# Patient Record
Sex: FEMALE | Race: WHITE | Employment: OTHER | URBAN - METROPOLITAN AREA
[De-identification: names, ages, dates, MRNs, and addresses within clinical notes are randomized per-mention and may not be internally consistent; named-entity substitution may affect disease eponyms.]

---

## 2022-01-17 ENCOUNTER — HOSPITAL ENCOUNTER (INPATIENT)
Age: 87
LOS: 3 days | Discharge: HOME HEALTH | DRG: 871 | End: 2022-01-21
Attending: EMERGENCY MEDICINE | Admitting: INTERNAL MEDICINE
Payer: MEDICARE

## 2022-01-17 DIAGNOSIS — K81.1 CHRONIC CHOLECYSTITIS: ICD-10-CM

## 2022-01-17 DIAGNOSIS — K86.89 PANCREATIC MASS: ICD-10-CM

## 2022-01-17 DIAGNOSIS — K85.90 ACUTE PANCREATITIS, UNSPECIFIED COMPLICATION STATUS, UNSPECIFIED PANCREATITIS TYPE: Primary | ICD-10-CM

## 2022-01-17 DIAGNOSIS — K83.09 ASCENDING CHOLANGITIS: ICD-10-CM

## 2022-01-17 PROCEDURE — 51702 INSERT TEMP BLADDER CATH: CPT

## 2022-01-17 PROCEDURE — 96374 THER/PROPH/DIAG INJ IV PUSH: CPT

## 2022-01-17 PROCEDURE — 93005 ELECTROCARDIOGRAM TRACING: CPT | Performed by: STUDENT IN AN ORGANIZED HEALTH CARE EDUCATION/TRAINING PROGRAM

## 2022-01-17 PROCEDURE — 99285 EMERGENCY DEPT VISIT HI MDM: CPT

## 2022-01-18 ENCOUNTER — APPOINTMENT (OUTPATIENT)
Dept: ULTRASOUND IMAGING | Age: 87
DRG: 871 | End: 2022-01-18
Payer: MEDICARE

## 2022-01-18 ENCOUNTER — APPOINTMENT (OUTPATIENT)
Dept: NUCLEAR MEDICINE | Age: 87
DRG: 871 | End: 2022-01-18
Payer: MEDICARE

## 2022-01-18 ENCOUNTER — APPOINTMENT (OUTPATIENT)
Dept: MRI IMAGING | Age: 87
DRG: 871 | End: 2022-01-18
Payer: MEDICARE

## 2022-01-18 PROBLEM — A41.9 SEPSIS (HCC): Status: ACTIVE | Noted: 2022-01-18

## 2022-01-18 PROBLEM — K83.09 ASCENDING CHOLANGITIS: Status: ACTIVE | Noted: 2022-01-18

## 2022-01-18 PROBLEM — K81.9 CHOLECYSTITIS: Status: ACTIVE | Noted: 2022-01-18

## 2022-01-18 PROBLEM — I10 ESSENTIAL HYPERTENSION: Status: ACTIVE | Noted: 2022-01-18

## 2022-01-18 PROBLEM — K85.10 ACUTE BILIARY PANCREATITIS: Status: ACTIVE | Noted: 2022-01-18

## 2022-01-18 LAB
-: NORMAL
ALBUMIN SERPL-MCNC: 3.3 G/DL (ref 3.5–5.2)
ALBUMIN/GLOBULIN RATIO: 1.2 (ref 1–2.5)
ALP BLD-CCNC: 185 U/L (ref 35–104)
ALT SERPL-CCNC: 946 U/L (ref 5–33)
AMYLASE: 646 U/L (ref 28–100)
ANION GAP SERPL CALCULATED.3IONS-SCNC: 10 MMOL/L (ref 9–17)
ANION GAP SERPL CALCULATED.3IONS-SCNC: 12 MMOL/L (ref 9–17)
AST SERPL-CCNC: 1139 U/L
BILIRUB SERPL-MCNC: 2.92 MG/DL (ref 0.3–1.2)
BUN BLDV-MCNC: 17 MG/DL (ref 8–23)
BUN BLDV-MCNC: 20 MG/DL (ref 8–23)
BUN/CREAT BLD: ABNORMAL (ref 9–20)
BUN/CREAT BLD: ABNORMAL (ref 9–20)
CALCIUM IONIZED: 1.12 MMOL/L (ref 1.13–1.33)
CALCIUM SERPL-MCNC: 8.3 MG/DL (ref 8.6–10.4)
CALCIUM SERPL-MCNC: 8.3 MG/DL (ref 8.6–10.4)
CHLORIDE BLD-SCNC: 105 MMOL/L (ref 98–107)
CHLORIDE BLD-SCNC: 109 MMOL/L (ref 98–107)
CO2: 22 MMOL/L (ref 20–31)
CO2: 23 MMOL/L (ref 20–31)
CREAT SERPL-MCNC: 0.75 MG/DL (ref 0.5–0.9)
CREAT SERPL-MCNC: 0.79 MG/DL (ref 0.5–0.9)
EKG ATRIAL RATE: 76 BPM
EKG P AXIS: 88 DEGREES
EKG P-R INTERVAL: 140 MS
EKG Q-T INTERVAL: 388 MS
EKG QRS DURATION: 80 MS
EKG QTC CALCULATION (BAZETT): 436 MS
EKG R AXIS: -4 DEGREES
EKG T AXIS: 34 DEGREES
EKG VENTRICULAR RATE: 76 BPM
GFR AFRICAN AMERICAN: >60 ML/MIN
GFR AFRICAN AMERICAN: >60 ML/MIN
GFR NON-AFRICAN AMERICAN: >60 ML/MIN
GFR NON-AFRICAN AMERICAN: >60 ML/MIN
GFR SERPL CREATININE-BSD FRML MDRD: ABNORMAL ML/MIN/{1.73_M2}
GLUCOSE BLD-MCNC: 121 MG/DL (ref 70–99)
GLUCOSE BLD-MCNC: 151 MG/DL (ref 70–99)
HCT VFR BLD CALC: 38 % (ref 36.3–47.1)
HEMOGLOBIN: 12.5 G/DL (ref 11.9–15.1)
INR BLD: 1.1
LACTIC ACID, SEPSIS WHOLE BLOOD: 2.3 MMOL/L (ref 0.5–1.9)
LACTIC ACID, SEPSIS: ABNORMAL MMOL/L (ref 0.5–1.9)
LIPASE: 1473 U/L (ref 13–60)
MAGNESIUM: 1.4 MG/DL (ref 1.6–2.6)
MCH RBC QN AUTO: 29.8 PG (ref 25.2–33.5)
MCHC RBC AUTO-ENTMCNC: 32.9 G/DL (ref 28.4–34.8)
MCV RBC AUTO: 90.7 FL (ref 82.6–102.9)
NRBC AUTOMATED: 0 PER 100 WBC
PDW BLD-RTO: 15.7 % (ref 11.8–14.4)
PHOSPHORUS: 2.8 MG/DL (ref 2.6–4.5)
PLATELET # BLD: 171 K/UL (ref 138–453)
PMV BLD AUTO: 11.1 FL (ref 8.1–13.5)
POTASSIUM SERPL-SCNC: 3.5 MMOL/L (ref 3.7–5.3)
POTASSIUM SERPL-SCNC: 4.6 MMOL/L (ref 3.7–5.3)
PROTHROMBIN TIME: 11.3 SEC (ref 9.1–12.3)
RBC # BLD: 4.19 M/UL (ref 3.95–5.11)
REASON FOR REJECTION: NORMAL
SARS-COV-2, RAPID: NOT DETECTED
SODIUM BLD-SCNC: 139 MMOL/L (ref 135–144)
SODIUM BLD-SCNC: 142 MMOL/L (ref 135–144)
SPECIMEN DESCRIPTION: NORMAL
TOTAL PROTEIN: 6 G/DL (ref 6.4–8.3)
TRIGL SERPL-MCNC: 55 MG/DL
WBC # BLD: 18.5 K/UL (ref 3.5–11.3)
ZZ NTE CLEAN UP: ORDERED TEST: NORMAL
ZZ NTE WITH NAME CLEAN UP: SPECIMEN SOURCE: NORMAL

## 2022-01-18 PROCEDURE — 2580000003 HC RX 258: Performed by: NURSE PRACTITIONER

## 2022-01-18 PROCEDURE — 2580000003 HC RX 258: Performed by: CLINICAL NURSE SPECIALIST

## 2022-01-18 PROCEDURE — 99222 1ST HOSP IP/OBS MODERATE 55: CPT | Performed by: INTERNAL MEDICINE

## 2022-01-18 PROCEDURE — 83690 ASSAY OF LIPASE: CPT

## 2022-01-18 PROCEDURE — 85027 COMPLETE CBC AUTOMATED: CPT

## 2022-01-18 PROCEDURE — 6360000002 HC RX W HCPCS: Performed by: STUDENT IN AN ORGANIZED HEALTH CARE EDUCATION/TRAINING PROGRAM

## 2022-01-18 PROCEDURE — 3430000000 HC RX DIAGNOSTIC RADIOPHARMACEUTICAL: Performed by: NURSE PRACTITIONER

## 2022-01-18 PROCEDURE — 87635 SARS-COV-2 COVID-19 AMP PRB: CPT

## 2022-01-18 PROCEDURE — A9537 TC99M MEBROFENIN: HCPCS | Performed by: NURSE PRACTITIONER

## 2022-01-18 PROCEDURE — 80048 BASIC METABOLIC PNL TOTAL CA: CPT

## 2022-01-18 PROCEDURE — 82150 ASSAY OF AMYLASE: CPT

## 2022-01-18 PROCEDURE — 83605 ASSAY OF LACTIC ACID: CPT

## 2022-01-18 PROCEDURE — 6360000002 HC RX W HCPCS: Performed by: INTERNAL MEDICINE

## 2022-01-18 PROCEDURE — 87040 BLOOD CULTURE FOR BACTERIA: CPT

## 2022-01-18 PROCEDURE — 36415 COLL VENOUS BLD VENIPUNCTURE: CPT

## 2022-01-18 PROCEDURE — 2500000003 HC RX 250 WO HCPCS: Performed by: STUDENT IN AN ORGANIZED HEALTH CARE EDUCATION/TRAINING PROGRAM

## 2022-01-18 PROCEDURE — 82330 ASSAY OF CALCIUM: CPT

## 2022-01-18 PROCEDURE — 83735 ASSAY OF MAGNESIUM: CPT

## 2022-01-18 PROCEDURE — 84100 ASSAY OF PHOSPHORUS: CPT

## 2022-01-18 PROCEDURE — 6370000000 HC RX 637 (ALT 250 FOR IP): Performed by: CLINICAL NURSE SPECIALIST

## 2022-01-18 PROCEDURE — 2580000003 HC RX 258: Performed by: INTERNAL MEDICINE

## 2022-01-18 PROCEDURE — 78226 HEPATOBILIARY SYSTEM IMAGING: CPT

## 2022-01-18 PROCEDURE — 99223 1ST HOSP IP/OBS HIGH 75: CPT | Performed by: INTERNAL MEDICINE

## 2022-01-18 PROCEDURE — 99222 1ST HOSP IP/OBS MODERATE 55: CPT | Performed by: NURSE PRACTITIONER

## 2022-01-18 PROCEDURE — 80053 COMPREHEN METABOLIC PANEL: CPT

## 2022-01-18 PROCEDURE — 6370000000 HC RX 637 (ALT 250 FOR IP): Performed by: INTERNAL MEDICINE

## 2022-01-18 PROCEDURE — 1200000000 HC SEMI PRIVATE

## 2022-01-18 PROCEDURE — 84478 ASSAY OF TRIGLYCERIDES: CPT

## 2022-01-18 PROCEDURE — 76705 ECHO EXAM OF ABDOMEN: CPT

## 2022-01-18 PROCEDURE — 85610 PROTHROMBIN TIME: CPT

## 2022-01-18 PROCEDURE — 6360000002 HC RX W HCPCS: Performed by: NURSE PRACTITIONER

## 2022-01-18 RX ORDER — ONDANSETRON 4 MG/1
4 TABLET, ORALLY DISINTEGRATING ORAL EVERY 8 HOURS PRN
Status: DISCONTINUED | OUTPATIENT
Start: 2022-01-18 | End: 2022-01-21 | Stop reason: HOSPADM

## 2022-01-18 RX ORDER — SODIUM CHLORIDE 0.9 % (FLUSH) 0.9 %
5-40 SYRINGE (ML) INJECTION EVERY 12 HOURS SCHEDULED
Status: DISCONTINUED | OUTPATIENT
Start: 2022-01-18 | End: 2022-01-21 | Stop reason: HOSPADM

## 2022-01-18 RX ORDER — MAGNESIUM SULFATE 1 G/100ML
1000 INJECTION INTRAVENOUS PRN
Status: DISCONTINUED | OUTPATIENT
Start: 2022-01-18 | End: 2022-01-21 | Stop reason: HOSPADM

## 2022-01-18 RX ORDER — POTASSIUM CHLORIDE 7.45 MG/ML
10 INJECTION INTRAVENOUS PRN
Status: DISCONTINUED | OUTPATIENT
Start: 2022-01-18 | End: 2022-01-21 | Stop reason: HOSPADM

## 2022-01-18 RX ORDER — SODIUM CHLORIDE 9 MG/ML
25 INJECTION, SOLUTION INTRAVENOUS PRN
Status: DISCONTINUED | OUTPATIENT
Start: 2022-01-18 | End: 2022-01-21 | Stop reason: HOSPADM

## 2022-01-18 RX ORDER — ONDANSETRON 2 MG/ML
4 INJECTION INTRAMUSCULAR; INTRAVENOUS ONCE
Status: COMPLETED | OUTPATIENT
Start: 2022-01-18 | End: 2022-01-18

## 2022-01-18 RX ORDER — MIDODRINE HYDROCHLORIDE 2.5 MG/1
2.5 TABLET ORAL
Status: DISCONTINUED | OUTPATIENT
Start: 2022-01-18 | End: 2022-01-21 | Stop reason: HOSPADM

## 2022-01-18 RX ORDER — ATORVASTATIN CALCIUM 80 MG/1
40 TABLET, FILM COATED ORAL NIGHTLY
Status: DISCONTINUED | OUTPATIENT
Start: 2022-01-18 | End: 2022-01-21 | Stop reason: HOSPADM

## 2022-01-18 RX ORDER — HYDROCODONE BITARTRATE AND ACETAMINOPHEN 5; 325 MG/1; MG/1
2 TABLET ORAL EVERY 4 HOURS PRN
Status: DISCONTINUED | OUTPATIENT
Start: 2022-01-18 | End: 2022-01-21 | Stop reason: HOSPADM

## 2022-01-18 RX ORDER — 0.9 % SODIUM CHLORIDE 0.9 %
250 INTRAVENOUS SOLUTION INTRAVENOUS ONCE
Status: COMPLETED | OUTPATIENT
Start: 2022-01-18 | End: 2022-01-18

## 2022-01-18 RX ORDER — SODIUM CHLORIDE 9 MG/ML
INJECTION, SOLUTION INTRAVENOUS CONTINUOUS
Status: DISCONTINUED | OUTPATIENT
Start: 2022-01-18 | End: 2022-01-18

## 2022-01-18 RX ORDER — CIPROFLOXACIN 2 MG/ML
400 INJECTION, SOLUTION INTRAVENOUS EVERY 12 HOURS
Status: DISCONTINUED | OUTPATIENT
Start: 2022-01-18 | End: 2022-01-18

## 2022-01-18 RX ORDER — POTASSIUM CHLORIDE 20 MEQ/1
40 TABLET, EXTENDED RELEASE ORAL ONCE
Status: COMPLETED | OUTPATIENT
Start: 2022-01-18 | End: 2022-01-18

## 2022-01-18 RX ORDER — ONDANSETRON 2 MG/ML
4 INJECTION INTRAMUSCULAR; INTRAVENOUS EVERY 6 HOURS PRN
Status: DISCONTINUED | OUTPATIENT
Start: 2022-01-18 | End: 2022-01-21 | Stop reason: HOSPADM

## 2022-01-18 RX ORDER — LOSARTAN POTASSIUM 50 MG/1
100 TABLET ORAL DAILY
Status: DISCONTINUED | OUTPATIENT
Start: 2022-01-18 | End: 2022-01-21 | Stop reason: HOSPADM

## 2022-01-18 RX ORDER — SODIUM CHLORIDE 0.9 % (FLUSH) 0.9 %
5-40 SYRINGE (ML) INJECTION PRN
Status: DISCONTINUED | OUTPATIENT
Start: 2022-01-18 | End: 2022-01-21 | Stop reason: HOSPADM

## 2022-01-18 RX ORDER — HYDROCODONE BITARTRATE AND ACETAMINOPHEN 5; 325 MG/1; MG/1
1 TABLET ORAL EVERY 4 HOURS PRN
Status: DISCONTINUED | OUTPATIENT
Start: 2022-01-18 | End: 2022-01-21 | Stop reason: HOSPADM

## 2022-01-18 RX ORDER — ACETAMINOPHEN 325 MG/1
650 TABLET ORAL EVERY 4 HOURS PRN
Status: DISCONTINUED | OUTPATIENT
Start: 2022-01-18 | End: 2022-01-21 | Stop reason: HOSPADM

## 2022-01-18 RX ORDER — SODIUM CHLORIDE, SODIUM LACTATE, POTASSIUM CHLORIDE, AND CALCIUM CHLORIDE .6; .31; .03; .02 G/100ML; G/100ML; G/100ML; G/100ML
500 INJECTION, SOLUTION INTRAVENOUS ONCE
Status: COMPLETED | OUTPATIENT
Start: 2022-01-18 | End: 2022-01-18

## 2022-01-18 RX ORDER — PANTOPRAZOLE SODIUM 40 MG/1
40 TABLET, DELAYED RELEASE ORAL
Status: DISCONTINUED | OUTPATIENT
Start: 2022-01-18 | End: 2022-01-21 | Stop reason: HOSPADM

## 2022-01-18 RX ORDER — SODIUM CHLORIDE, SODIUM LACTATE, POTASSIUM CHLORIDE, CALCIUM CHLORIDE 600; 310; 30; 20 MG/100ML; MG/100ML; MG/100ML; MG/100ML
INJECTION, SOLUTION INTRAVENOUS CONTINUOUS
Status: ACTIVE | OUTPATIENT
Start: 2022-01-18 | End: 2022-01-19

## 2022-01-18 RX ORDER — CIPROFLOXACIN 2 MG/ML
400 INJECTION, SOLUTION INTRAVENOUS ONCE
Status: COMPLETED | OUTPATIENT
Start: 2022-01-18 | End: 2022-01-18

## 2022-01-18 RX ORDER — MORPHINE SULFATE 4 MG/ML
2 INJECTION, SOLUTION INTRAMUSCULAR; INTRAVENOUS ONCE
Status: DISCONTINUED | OUTPATIENT
Start: 2022-01-18 | End: 2022-01-21 | Stop reason: HOSPADM

## 2022-01-18 RX ADMIN — METRONIDAZOLE 500 MG: 500 INJECTION, SOLUTION INTRAVENOUS at 02:36

## 2022-01-18 RX ADMIN — SODIUM CHLORIDE 250 ML: 0.9 INJECTION, SOLUTION INTRAVENOUS at 05:00

## 2022-01-18 RX ADMIN — MIDODRINE HYDROCHLORIDE 2.5 MG: 2.5 TABLET ORAL at 12:06

## 2022-01-18 RX ADMIN — CIPROFLOXACIN 400 MG: 2 INJECTION, SOLUTION INTRAVENOUS at 00:23

## 2022-01-18 RX ADMIN — MIDODRINE HYDROCHLORIDE 2.5 MG: 2.5 TABLET ORAL at 17:28

## 2022-01-18 RX ADMIN — PIPERACILLIN AND TAZOBACTAM 3375 MG: 3; .375 INJECTION, POWDER, FOR SOLUTION INTRAVENOUS at 10:53

## 2022-01-18 RX ADMIN — SODIUM CHLORIDE: 9 INJECTION, SOLUTION INTRAVENOUS at 02:45

## 2022-01-18 RX ADMIN — ATORVASTATIN CALCIUM 40 MG: 80 TABLET, FILM COATED ORAL at 20:31

## 2022-01-18 RX ADMIN — ONDANSETRON 4 MG: 2 INJECTION INTRAMUSCULAR; INTRAVENOUS at 00:12

## 2022-01-18 RX ADMIN — SINCALIDE 1.32 MCG: 5 INJECTION, POWDER, LYOPHILIZED, FOR SOLUTION INTRAVENOUS at 14:00

## 2022-01-18 RX ADMIN — Medication 3 MILLICURIE: at 14:35

## 2022-01-18 RX ADMIN — SODIUM CHLORIDE, POTASSIUM CHLORIDE, SODIUM LACTATE AND CALCIUM CHLORIDE 500 ML: 600; 310; 30; 20 INJECTION, SOLUTION INTRAVENOUS at 07:56

## 2022-01-18 RX ADMIN — SODIUM CHLORIDE, POTASSIUM CHLORIDE, SODIUM LACTATE AND CALCIUM CHLORIDE: 600; 310; 30; 20 INJECTION, SOLUTION INTRAVENOUS at 10:44

## 2022-01-18 RX ADMIN — MIDODRINE HYDROCHLORIDE 2.5 MG: 2.5 TABLET ORAL at 09:00

## 2022-01-18 RX ADMIN — POTASSIUM CHLORIDE 40 MEQ: 1500 TABLET, EXTENDED RELEASE ORAL at 10:54

## 2022-01-18 ASSESSMENT — ENCOUNTER SYMPTOMS
DIARRHEA: 0
TROUBLE SWALLOWING: 0
VOMITING: 1
NAUSEA: 1
ABDOMINAL PAIN: 1
SINUS PRESSURE: 0
RHINORRHEA: 0
ABDOMINAL PAIN: 0
COLOR CHANGE: 0
BLOOD IN STOOL: 0
VOMITING: 0
CONSTIPATION: 0
SINUS PAIN: 0
COUGH: 0
SHORTNESS OF BREATH: 0
CHEST TIGHTNESS: 0
BACK PAIN: 0
DIARRHEA: 1
EYE REDNESS: 0
SORE THROAT: 0

## 2022-01-18 NOTE — CONSULTS
History and Physical - General Surgery    PATIENT NAME: Heydi Bhakta  AGE: 80 y.o. MEDICAL RECORD NO. [id number]  DATE: 1/18/2022  SURGEON: Dr. Joe Burkett: No primary care provider on file. Patient evaluated at the request of  Dr. Rosalva Singh  Reason for evaluation: Concern for gallstone pancreatitis     Patient information was obtained from past medical records. History/Exam limitations: dementia. Patient presented to the Emergency Department by ambulance where the patient received see Ambulance Run Sheet prior to arrival.    IMPRESSION:     Patient Active Problem List   Diagnosis    Cholecystitis    Acute biliary pancreatitis    Essential hypertension   80year old female, likely gallstone pancreatitis       MEDICAL DECISION MAKING AND PLAN:   Recommend admission to medicine  Trend lipase, LFT's  RUQ Ultrasound   Recommend GI consult for possible MRCP vs ERCP for further evaluation of dilated CBD and elevated LFTs  Recommend IV abx: cipro, flagyl  Will continue to follow and determine need for cholecystectomy after improvement of pancreatitis and choledocholithiasis work up complete      HISTORY:   History of Chief Complaint:    Heydi Bhakta is a 80 y.o. female who presents as a transfer from [facility] for further evaluation of possible gallstone pancreatitis. Patient has a history of dementia, and history was obtained from hospital records. Patient reportedly with some diffuse abdominal pain and an episode of emesis today, which led her to seek treatment. Patient underwent CT imaging that demonstrated gallbladder distention and wall thickening as well as a prominent CBD and stranding around the pancrease. WBC 11.5, T bili 2.2, lipase 11,893, and AST//326. She was transferred to [facility] for GI and general surgery evaluation. Past Medical History   has a past medical history of Essential hypertension, Hyperlipidemia, and Pre-diabetes.   Past Surgical History   has a past surgical history that includes Appendectomy (N/A); katia and bso (cervix removed) (N/A); Colonoscopy (N/A); and Pacemaker insertion (N/A, 12/30/2015). Medications  Prior to Admission medications    Not on File    Scheduled Meds:   morphine  2 mg IntraVENous Once    ciprofloxacin  400 mg IntraVENous Q12H    metroNIDAZOLE  500 mg IntraVENous Q8H    sodium chloride flush  5-40 mL IntraVENous 2 times per day    enoxaparin  40 mg SubCUTAneous Daily    atorvastatin  40 mg Oral Nightly    [Held by provider] losartan  100 mg Oral Daily    pantoprazole  40 mg Oral QAM AC     Continuous Infusions:   PRN Meds:. Allergies  is allergic to adhesive tape. Family History  family history includes No Known Problems in her father and mother. Social History   reports that she has never smoked. She has never used smokeless tobacco.   reports no history of alcohol use. reports no history of drug use. Review of Systems  Unable to obtain accurate ROS as patient has a history of dementia and answers \"yes\" to all questions     PHYSICAL:   VITALS:  height is 5' 4\" (1.626 m) and weight is 145 lb (65.8 kg). Her oral temperature is 97.4 °F (36.3 °C). Her blood pressure is 170/59 (abnormal) and her pulse is 79. Her respiration is 20 and oxygen saturation is 98%. CONSTITUTIONAL: awake, alert, no apparent distress   HEENT: NCAT   NECK: supple, trachea midline   LUNGS: no acute respiratory distress, or accessory muscle use   CARDIOVASCULAR:regular rate   ABDOMEN: soft, non-distended, some tenderness to RUQ with palpation.  No rebound, guarding, or rigidity   NEUROLOGIC: CN II-XII are grossly intact, baseline dementia   EXTREMITIES: no cyanosis, clubbing or edema    LABS:   CBC with Differential:    Lab Results   Component Value Date    WBC 18.5 01/18/2022    RBC 4.19 01/18/2022    HGB 12.5 01/18/2022    HCT 38.0 01/18/2022     01/18/2022    MCV 90.7 01/18/2022    MCH 29.8 01/18/2022    MCHC 32.9 01/18/2022    RDW 15.7 01/18/2022 BMP:    Lab Results   Component Value Date     01/18/2022    K 3.5 01/18/2022     01/18/2022    CO2 22 01/18/2022    BUN 17 01/18/2022    LABALBU 3.3 01/18/2022    CREATININE 0.79 01/18/2022    CALCIUM 8.3 01/18/2022    GFRAA >60 01/18/2022    LABGLOM >60 01/18/2022    GLUCOSE 151 01/18/2022     Hepatic Function Panel:    Lab Results   Component Value Date    ALKPHOS 185 01/18/2022    ALT PENDING 01/18/2022    AST PENDING 01/18/2022    PROT 6.0 01/18/2022    BILITOT 2.92 01/18/2022    LABALBU 3.3 01/18/2022       RADIOLOGY:   CT imaging performed at [facility]. Images sent to be uploaded to PACS. Thank you for the interesting evaluation. Further recommendations to follow. Arsen Damon DO  1/18/22, 12:05 AM        Attending Note    Patient seen as a general surgery consultation for gallstone pancreatitis. GI medicine to evaluate for need for ERCP  I have reviewed the above TECSS note(s) and I either performed the key elements of the medical history and physical exam or was present with the resident when the key elements of the medical history and physical exam were performed. I have discussed the findings, established the care plan and recommendations with Resident Lico.     Casandra Cr MD  1/18/2022  8:23 AM

## 2022-01-18 NOTE — CONSULTS
[facility] -   Infectious diseases evaluation  admission date 1/17/2022    reason for consultation:   Ascending cholangitis    Impression :   Current:  · Ascending cholangitis   · Acute Cholecystitis w biliary obstruction  · Pancreatitis - likely gallstone   · Leukocytosis     Other:  ·   Discussion / summary of stay / plan of care   ·   Recommendations   · cipro and flagyl stopped  · On zosyn   ·  HIDA - acute kathi and biliary obst picture  · GI consulted  · cannot get MRI due to pacemaker not being compatible     Infection Control Recommendations   · [place] Precautions      Antimicrobial Stewardship Recommendations   · Simplification of therapy  · Targeted therapy    Coordination ofOutpatient Care:   · Estimated Length of IV antimicrobials:  · Patient will need Midline / picc Catheter Insertion:   · Patient will need SNF:  · Patient will need outpatient wound care:     History of Present Illness:   Initial history:  Leon Baer is a 80y.o.-year-old female who presented to the ED from [facility] for concerns of acute cholecystitis vs pancreatitis. She was having abd pain and nausea/vomiting with  and , elevated total bilirubin of 2.2, indirect bilirubin 1.2, lipase 73206, and WBC 11.5. CT abd pelvis w/ contrast showed dilation and wall thickening of the gallbladder and a dilated common bile duct with fat stranding around the pancreas. She was transferred here to see GI for possible ERCP and GS evaluation. On my exam ,after the HIDA. Pleasant confused and no abd pain or tenderness - no nausea or vomiting at this time  Not in distress  No cellultiis      Interval changes  1/18/2022   BP (!) 105/49   Pulse 65   Temp 97.4 °F (36.3 °C) (Oral)   Resp 18   Ht 5' 4\" (1.626 m)   Wt 145 lb (65.8 kg)   SpO2 95%   BMI 24.89 kg/m²    Remains afebrile. Hypotension frequently.      Summary of relevant labs:  Labs:  WBC 18.5  Cr 0.79    AST 1139  Bilirubin 2.92  Lipase 1473    Micro:  Blood cx ordered     Imaging:  HIDA  Acute cholecystitis with biliary obstruction    US gallbladder RUQ 1/18: cholelithiasis with significant gallbladder wall edema seen in the setting of pancreatitis, hepatitis or acute cholecystitis. Trace amount of fluid and Man's pouch. MRI abd ordered     I have personally reviewed the past medical history, past surgical history, medications, social history, and family history, and I haveupdated the database accordingly.   Past Medical History:     Past Medical History:   Diagnosis Date    Essential hypertension     Hyperlipidemia     Pre-diabetes        Past Surgical  History:     Past Surgical History:   Procedure Laterality Date    APPENDECTOMY N/A     COLONOSCOPY N/A     PACEMAKER INSERTION N/A 12/30/2015    KIRSTEN AND BSO N/A        Medications:      morphine  2 mg IntraVENous Once    sodium chloride flush  5-40 mL IntraVENous 2 times per day    enoxaparin  40 mg SubCUTAneous Daily    atorvastatin  40 mg Oral Nightly    [Held by provider] losartan  100 mg Oral Daily    pantoprazole  40 mg Oral QAM AC    lactated ringers bolus  500 mL IntraVENous Once    midodrine  2.5 mg Oral TID WC    piperacillin-tazobactam  3,375 mg IntraVENous Q8H       Social History:     Social History     Socioeconomic History    Marital status: Unknown     Spouse name: Not on file    Number of children: Not on file    Years of education: Not on file    Highest education level: Not on file   Occupational History    Not on file   Tobacco Use    Smoking status: Never Smoker    Smokeless tobacco: Never Used   Substance and Sexual Activity    Alcohol use: Never    Drug use: Never    Sexual activity: Not on file   Other Topics Concern    Not on file   Social History Narrative    Not on file     Social Determinants of Health     Financial Resource Strain:     Difficulty of Paying Living Expenses: Not on file   Food Insecurity:  Worried About Running Out of Food in the Last Year: Not on file    Fred of Food in the Last Year: Not on file   Transportation Needs:     Lack of Transportation (Medical): Not on file    Lack of Transportation (Non-Medical): Not on file   Physical Activity:     Days of Exercise per Week: Not on file    Minutes of Exercise per Session: Not on file   Stress:     Feeling of Stress : Not on file   Social Connections:     Frequency of Communication with Friends and Family: Not on file    Frequency of Social Gatherings with Friends and Family: Not on file    Attends Muslim Services: Not on file    Active Member of Clubs or Organizations: Not on file    Attends Club or Organization Meetings: Not on file    Marital Status: Not on file   Intimate Partner Violence:     Fear of Current or Ex-Partner: Not on file    Emotionally Abused: Not on file    Physically Abused: Not on file    Sexually Abused: Not on file   Housing Stability:     Unable to Pay for Housing in the Last Year: Not on file    Number of [place] in the Last Year: Not on file    Unstable Housing in the Last Year: Not on file       Family History:     Family History   Problem Relation Age of Onset    No Known Problems Mother     No Known Problems Father         Allergies:   Adhesive tape     Review of Systems:     Review of Systems   Constitutional: Negative for chills and fever. HENT: Negative for congestion and rhinorrhea. Eyes: Negative for redness and visual disturbance. Respiratory: Negative for cough and shortness of breath. Cardiovascular: Negative for chest pain and leg swelling. Gastrointestinal: Positive for nausea and vomiting. Negative for abdominal pain. Endocrine: Negative for polyphagia. Genitourinary: Negative for dysuria and frequency. Musculoskeletal: Negative for arthralgias and myalgias. Skin: Negative for color change and rash.    Allergic/Immunologic: Negative for immunocompromised state.   Neurological: Negative for dizziness, light-headedness and headaches. Hematological: Negative for adenopathy. Psychiatric/Behavioral: Positive for confusion. Negative for agitation and sleep disturbance. The patient is not nervous/anxious. Physical Examination :     Patient Vitals for the past 8 hrs:   BP Pulse Resp SpO2   01/18/22 0757 (!) 105/49 65 18 95 %   01/18/22 0705 (!) 93/40 66 20 97 %   01/18/22 0650 (!) 97/47 65 20    01/18/22 0649    98 %   01/18/22 0605  65 18 98 %   01/18/22 0547 (!) 96/46 70 18    01/18/22 0546    98 %   01/18/22 0542  67 20 97 %   01/18/22 0516 (!) 87/43 67 20 97 %   01/18/22 0445 (!) 93/43 68 22 98 %   01/18/22 0355  69 19 99 %   01/18/22 0331 (!) 99/53 71 20 96 %   01/18/22 0301 (!) 105/49 67 24 97 %   01/18/22 0251    98 %   01/18/22 0247 (!) 112/53 68 22    01/18/22 0117 (!) 125/58 73 26 97 %   01/18/22 0102 (!) 128/56 69 23 99 %   01/18/22 0003 (!) 156/53 68 29 98 %       Physical Exam  Constitutional:       Appearance: She is normal weight. She is ill-appearing. HENT:      Head: Normocephalic and atraumatic. Nose: Nose normal.      Mouth/Throat:      Mouth: Mucous membranes are moist.      Pharynx: Oropharynx is clear. Eyes:      General: No scleral icterus. Extraocular Movements: Extraocular movements intact. Conjunctiva/sclera: Conjunctivae normal.   Cardiovascular:      Rate and Rhythm: Normal rate and regular rhythm. Pulses: Normal pulses. Heart sounds: Normal heart sounds. Pulmonary:      Effort: Pulmonary effort is normal. No respiratory distress. Breath sounds: Normal breath sounds. Abdominal:      General: Abdomen is flat. Palpations: Abdomen is soft. Tenderness: There is no abdominal tenderness. There is no guarding. Genitourinary:     Comments: No proctor  No CVA tenderness  Musculoskeletal:         General: Normal range of motion.       Cervical back: Normal range of motion and neck supple. Right lower leg: No edema. Left lower leg: No edema. Skin:     General: Skin is warm and dry. Capillary Refill: Capillary refill takes less than 2 seconds. Neurological:      Mental Status: She is alert. Mental status is at baseline. She is disoriented. Cranial Nerves: No cranial nerve deficit. Sensory: No sensory deficit. Psychiatric:         Behavior: Behavior normal.         Thought Content: Thought content normal.           Medical Decision Making:   I have independently reviewed/ordered the following labs:    CBC with Differential:   Recent Labs     01/18/22 0444   WBC 18.5*   HGB 12.5   HCT 38.0        BMP:  Recent Labs     01/18/22 0444      K 3.5*      CO2 22   BUN 17   CREATININE 0.79   MG 1.4*     Hepatic Function Panel:   Recent Labs     01/18/22 0444   PROT 6.0*   LABALBU 3.3*   BILITOT 2.92*   ALKPHOS 185*   *   AST 1,139*     No results for input(s): RPR in the last 72 hours. No results for input(s): HIV in the last 72 hours. No results for input(s): BC in the last 72 hours. Lab Results   Component Value Date    CREATININE 0.79 01/18/2022    GLUCOSE 151 01/18/2022     Thank you for allowing us to participate in the care of this patient. Please call with questions. This note is created with the assistance of a speech recognition program.  While intending to generate adocument that actually reflects the content of the visit, the document can still have some errors including those of syntax and sound a like substitutions which may escape proof reading. It such instances, actual meaningcan be extrapolated by contextual diversion. Kamla Hare, OMS-IV      I have discussed the care of the patient, including pertinent history and exam findings,  with the resident. I have seen and examined the patient and the key elements of all parts of the encounter have been performed by me.   I agree with the assessment, plan and orders as documented by the resident.     Lana Garibay, Infectious Diseases

## 2022-01-18 NOTE — H&P
[facility]  Office: [address], DO, Daya Valadez, DO, Celestino Pack, DO, Jay Santoyo, DO, Lindsey Lindsay MD, Faustina Harrington MD, Ciara Farrell MD, Will Bangura MD, Chapin Johnson MD, Kandice Ellis MD, Latia Arevalo MD, Amna Portillo, DO, Gosia Rod, DO, Henrry Monsivais MD,  Ann Burroughs DO, Noah Lay MD, Jose Stapleton MD, Herminio Islas MD, Radha Lu MD, Marisela Chambers MD, Lopez Baptiste MD, Breanna Hubbard MD, Don Franklin, [facility], [facility], CNP, Marco Alvarenga, CNP, Yolie Fischer, CNS, Tamela Griggs, CNP, Tyra Mae, CNP, Valerie Bell, CNP, Don Hernandez, CNP, Peyton Shabazz, CNP, Jigar Elizabeth PA-C, Roc Milner, DNP, Kandace Vides, LIZBETH, Klaudia Luna, CNP, Priya Tripp, CNP, Terrell Bernstein, CNP, Mor Obregon, CNP, Mila West, CNP, Main Gonzalez, [address]    HISTORY AND PHYSICAL EXAMINATION            Date:   1/18/2022  Patient name:  Moreno Vera  Date of admission:  1/17/2022 11:22 PM  MRN:   [id number]  Account:  [de-identified]  YOB: 1931  PCP:    Rebekah Man DO  Room:   03/03  Code Status:    Full Code    Chief Complaint:     Chief Complaint   Patient presents with    Abdominal Pain       History Obtained From:     patient, electronic medical record    History of Present Illness:     Moreno Vera is a 80 y.o. female who presents with Abdominal Pain  She was transferred to [facility] ED from [address] [place] [address] Dr. ARIZA Jan 17 for the management of Cholecystitis. Pt is a poor historian. ED notes indicate abd pain, N/V since this afternoon. Lipase 11,893, , , , T-bili 2.2, I-Bili 1.2 Lactic 1.7, WBC 11.5,  (<101), glucose 153, Covid neg    CT Abd/pelvis w IV contrast:  Distended gall bladder-5 cm (new since Aug '21), hyperdense material within the fundus-possible stones or sludge.   Fat stranding in pancreas    Pt received 1L IVF, Zosyn, Fentanyl & Zofran prior to transfer. Past Medical History:     Past Medical History:   Diagnosis Date    Essential hypertension     Hyperlipidemia         Past Surgical History:     Past Surgical History:   Procedure Laterality Date    APPENDECTOMY N/A     COLONOSCOPY N/A     PACEMAKER INSERTION N/A 2015    KIRSTEN AND BSO N/A         Medications Prior to Admission:     Prior to Admission medications    Not on File        Allergies:     Adhesive tape    Social History:     Tobacco:    reports that she has never smoked. She has never used smokeless tobacco.  Alcohol:      reports no history of alcohol use. Drug Use:  reports no history of drug use. Family History:     Family History   Problem Relation Age of Onset    No Known Problems Mother     No Known Problems Father        Review of Systems:     Positive and Negative as described in HPI. Review of Systems   Constitutional: Positive for appetite change. HENT: Negative for sore throat and trouble swallowing. Respiratory: Negative for shortness of breath. Cardiovascular: Negative for chest pain, palpitations and leg swelling. Gastrointestinal: Positive for abdominal pain, diarrhea, nausea and vomiting. Negative for blood in stool. Genitourinary: Negative for dysuria and hematuria. Neurological: Negative for headaches. All other systems reviewed and are negative. Physical Exam:   BP (!) 105/49   Pulse 67   Temp 97.4 °F (36.3 °C) (Oral)   Resp 24   Ht 5' 4\" (1.626 m)   Wt 145 lb (65.8 kg)   SpO2 97%   BMI 24.89 kg/m²   Temp (24hrs), Av.4 °F (36.3 °C), Min:97.4 °F (36.3 °C), Max:97.4 °F (36.3 °C)    No results for input(s): POCGLU in the last 72 hours. No intake or output data in the 24 hours ending 22 7132    Physical Exam  Vitals and nursing note reviewed. HENT:      Head: Normocephalic. Mouth/Throat:      Mouth: Mucous membranes are dry.       Pharynx: No oropharyngeal exudate. Comments: Poor dentition  Eyes:      General: No scleral icterus. Pupils: Pupils are equal, round, and reactive to light. Cardiovascular:      Rate and Rhythm: Normal rate and regular rhythm. Heart sounds: Murmur (soft sys best heard left sternal border) heard. Pulmonary:      Effort: Pulmonary effort is normal. No respiratory distress. Abdominal:      General: Bowel sounds are normal. There is distension. Palpations: Abdomen is soft. Tenderness: There is abdominal tenderness (LUQ). Comments: Minimal pain during exam   Musculoskeletal:         General: No swelling or tenderness. Lymphadenopathy:      Cervical: No cervical adenopathy. Skin:     General: Skin is warm and dry. Capillary Refill: Capillary refill takes less than 2 seconds. Coloration: Skin is pale. Neurological:      Mental Status: She is alert. Cranial Nerves: No cranial nerve deficit. Psychiatric:         Behavior: Behavior normal.         Investigations:      Laboratory Testing:  Recent Results (from the past 24 hour(s))   COVID-19, Rapid    Collection Time: 01/18/22  1:18 AM    Specimen: Nasopharyngeal Swab   Result Value Ref Range    Specimen Description . NASOPHARYNGEAL SWAB     SARS-CoV-2, Rapid Not Detected Not Detected       Imaging/Diagnostics:  No results found. Assessment :      Hospital Problems           Last Modified POA    * (Principal) Cholecystitis 1/18/2022 Yes    Acute biliary pancreatitis 1/18/2022 Yes    Essential hypertension 1/18/2022 Yes          Plan:     Patient status inpatient in the Med/Surge    1. NPO  2. Home meds reviewed and reordered  3. Lovenox for DVT prophylaxis  4. Consult Gen Surgery  5. Gall bladder US in am  6. Cipro/Flagyl  7. Norco/Dilaudid for pain  8.  Trend Labs    Consultations:   IP CONSULT TO GENERAL SURGERY  IP CONSULT TO HOSPITALIST    Patient is admitted as inpatient status because of co-morbidities listed above, severity of signs and symptoms as outlined, requirement for current medical therapies and most importantly because of direct risk to patient if care not provided in a hospital setting. Expected length of stay > 48 hours.     JACKELINE Garcia - CNS  1/18/2022  3:39 AM    Copy sent to Dr. Helga Berrios, DO

## 2022-01-18 NOTE — ED PROVIDER NOTES
[facility] ED     Emergency Department     Faculty Attestation    I performed a history and physical examination of the patient and discussed management with the resident. I reviewed the residents note and agree with the documented findings and plan of care. Any areas of disagreement are noted on the chart. I was personally present for the key portions of any procedures. I have documented in the chart those procedures where I was not present during the key portions. I have reviewed the emergency nurses triage note. I agree with the chief complaint, past medical history, past surgical history, allergies, medications, social and family history as documented unless otherwise noted below. For Physician Assistant/ Nurse Practitioner cases/documentation I have personally evaluated this patient and have completed at least one if not all key elements of the E/M (history, physical exam, and MDM). Additional findings are as noted. This patient was evaluated in the Emergency Department for symptoms described in the history of present illness. He/she was evaluated in the context of the global COVID-19 pandemic, which necessitated consideration that the patient might be at risk for infection with the SARS-CoV-2 virus that causes COVID-19. Institutional protocols and algorithms that pertain to the evaluation of patients at risk for COVID-19 are in a state of rapid change based on information released by regulatory bodies including the CDC and federal and state organizations. These policies and algorithms were followed during the patient's care in the ED. Patient transferred for pancreatitis concern for gallstone pancreatitis on CT. On arrival patient appears ill but not toxic. She is afebrile. Appears uncomfortable but resting in the bed. Abdomen soft focal epigastric mid abdominal tenderness but no rebound or guarding.   We will review transfer work-up, call general surgery, call GI, anticipate admission      EKG interpretation: Sinus rhythm 76 normal intervals normal axis no acute ST or T changes no acute findings    Critical Care     none    Marilyn Arguello MD, Umair Bang  Attending Emergency  Physician             Marilyn Arguello MD  01/17/22 1315

## 2022-01-18 NOTE — PROGRESS NOTES
Contacted patient's cardiologist's office to obtain records on patient's pacemaker. Office staff stated they will fax over interrogation report.

## 2022-01-18 NOTE — PROGRESS NOTES
Patient is poor historian. Unable to complete MRI checklist with patient.   Will attempt to contact family to see if they are able to help fill out MRI checklist.

## 2022-01-18 NOTE — ED NOTES
Pt repositioned. Denies any needs at this time. Resting on stretcher with eyes closed. RR even and unlabored. No distress noted. Call light within reach.      Esthela Bah RN  01/18/22 9421

## 2022-01-18 NOTE — ED PROVIDER NOTES
Dr Miguel Means saw pt initially     Marianna Becker DO  01/17/22 1024    Faculty Sign-Out Attestation  Handoff taken on the following patient from prior Attending Physician: Miguel Means    I was available and discussed any additional care issues that arose and coordinated the management plans with the resident(s) caring for the patient during my duty period. Any areas of disagreement with residents documentation of care or procedures are noted on the chart. I was personally present for the key portions of any/all procedures during my duty period. I have documented in the chart those procedures where I was not present during the key portions.     Transfer from out side facility, pancreatitis, needing egd,   admitted    Marianna Becker DO  Attending Physician     Marianna Becker DO  01/18/22 3472

## 2022-01-18 NOTE — ED NOTES
80 yof with abdominal pain white count of 12,000. CT with fat stranding around the gallbladder as well as pancreas and concern for gallstone pancreatitis. No GI or ability for ERCP there. Patient coming for general surgery and GI consultation.     Vital signs stable coming by omar Mobley RN  01/17/22 2901

## 2022-01-18 NOTE — ED NOTES
Pt's blood pressure trending down, now 93/43 while resting. Pt non-symptomatic. Mervat Jones NP notified face to face. 250ml fluid bolus started per verbal order.      Corazon Pace RN  01/18/22 5641

## 2022-01-18 NOTE — ED NOTES
RN updated pts son with pts permission. Jerri Sultana (son): [phone].       Rocio Mena RN  01/18/22 7695

## 2022-01-18 NOTE — ED PROVIDER NOTES
[facility] ED  Emergency Department Encounter  Emergency Medicine Resident     Pt Name: Amanda Peck  MRN: [id number]  Marcusgfashley 12/1/1931  Date of evaluation: 1/17/22  PCP:  Tavo Jones, [address] [place] [address]       Chief Complaint   Patient presents with    Abdominal Pain       HISTORY OFPRESENT ILLNESS  (Location/Symptom, Timing/Onset, Context/Setting, Quality, Duration, Modifying Dipika Broody.)      Amanda Peck is a 80 y. o.yo female who presents as a transfer from outside facility due to concerns for acute cholecystitis versus acute pancreatitis. Patient presented to the outside facility with abdominal pain, found to have elevated CBC. She had significantly elevated LFTs, AST//326. Patient also had an elevated T bili of 2.2, indirect bili is 1.2. Patient had a lipase of 11,893. WBC of 11.5. Underwent CT abdomen pelvis with IV contrast and found to have dilation and wall thickening of the gallbladder with dilated common bile duct and fat stranding around the pancreas. Outside facility did not have GI or ability for ERCP. Patient was transferred here for general surgery evaluation. On my evaluation patient is resting in bed, does admit to some abdominal pain, states her pain has been present since January, unsure how long its been there. Patient denies any vomiting, she does appear uncomfortable on exam.    PAST MEDICAL / SURGICAL / SOCIAL / FAMILY HISTORY      has no past medical history on file. has no past surgical history on file.      Social History     Socioeconomic History    Marital status: Unknown     Spouse name: Not on file    Number of children: Not on file    Years of education: Not on file    Highest education level: Not on file   Occupational History    Not on file   Tobacco Use    Smoking status: Not on file    Smokeless tobacco: Not on file   Substance and Sexual Activity    Alcohol use: Not on file    Drug use: Not on file    Sexual activity: Not on file   Other Topics Concern    Not on file   Social History Narrative    Not on file     Social Determinants of Health     Financial Resource Strain:     Difficulty of Paying Living Expenses: Not on file   Food Insecurity:     Worried About Running Out of Food in the Last Year: Not on file    Fred of Food in the Last Year: Not on file   Transportation Needs:     Lack of Transportation (Medical): Not on file    Lack of Transportation (Non-Medical): Not on file   Physical Activity:     Days of Exercise per Week: Not on file    Minutes of Exercise per Session: Not on file   Stress:     Feeling of Stress : Not on file   Social Connections:     Frequency of Communication with Friends and Family: Not on file    Frequency of Social Gatherings with Friends and Family: Not on file    Attends Nondenominational Services: Not on file    Active Member of [address] [place] [address] [employer] or Organizations: Not on file    Attends Club or Organization Meetings: Not on file    Marital Status: Not on file   Intimate Partner Violence:     Fear of Current or Ex-Partner: Not on file    Emotionally Abused: Not on file    Physically Abused: Not on file    Sexually Abused: Not on file   Housing Stability:     Unable to Pay for Housing in the Last Year: Not on file    Number of [place] in the Last Year: Not on file    Unstable Housing in the Last Year: Not on file       History reviewed. No pertinent family history. Allergies:  Adhesive tape    Home Medications:  Prior to Admission medications    Not on File       REVIEW OFSYSTEMS    (2-9 systems for level 4, 10 or more for level 5)      Review of Systems   Constitutional: Negative for chills, diaphoresis, fatigue and fever. HENT: Negative for congestion, sinus pressure, sinus pain, sore throat and trouble swallowing. Respiratory: Negative for cough, chest tightness and shortness of breath. Cardiovascular: Negative for chest pain, palpitations and leg swelling. Gastrointestinal: Positive for abdominal pain and nausea. Negative for constipation, diarrhea and vomiting. Genitourinary: Negative for difficulty urinating, dysuria, flank pain and urgency. Musculoskeletal: Negative for back pain, neck pain and neck stiffness. Skin: Negative for color change, pallor and rash. Neurological: Negative for dizziness, tremors, speech difficulty, weakness, light-headedness and headaches. PHYSICAL EXAM   (up to 7 for level 4, 8 or more forlevel 5)      INITIAL VITALS:   ED Triage Vitals   BP Temp Temp src Pulse Resp SpO2 Height Weight   170/59 97.4  79 20 98 -- --       Physical Exam  Constitutional:       General: She is not in acute distress. Appearance: She is ill-appearing. HENT:      Head: Normocephalic and atraumatic. Right Ear: External ear normal.      Left Ear: External ear normal.      Nose: Nose normal. No rhinorrhea. Eyes:      Extraocular Movements: Extraocular movements intact. Pupils: Pupils are equal, round, and reactive to light. Cardiovascular:      Rate and Rhythm: Normal rate and regular rhythm. Pulses: Normal pulses. Heart sounds: No murmur heard. Pulmonary:      Effort: Pulmonary effort is normal.      Breath sounds: Normal breath sounds. Abdominal:      General: Abdomen is protuberant. Bowel sounds are normal. There is distension. Palpations: Abdomen is soft. There is no fluid wave. Tenderness: There is abdominal tenderness in the right upper quadrant and epigastric area. Musculoskeletal:         General: No swelling. Normal range of motion. Cervical back: Normal range of motion and neck supple. Skin:     General: Skin is warm and dry. Neurological:      General: No focal deficit present. Mental Status: She is alert and oriented to person, place, and time.          DIFFERENTIAL  DIAGNOSIS     PLAN (LABS / IMAGING / EKG):  Orders Placed This Encounter   Procedures    Inpatient consult to General Surgery    Inpatient consult to Hospitalist    EKG 12 Lead       MEDICATIONS ORDERED:  Orders Placed This Encounter   Medications    morphine injection 2 mg    ondansetron (ZOFRAN) injection 4 mg    ciprofloxacin (CIPRO) IVPB 400 mg     Order Specific Question:   Antimicrobial Indications     Answer:   Intra-Abdominal Infection    metronidazole (FLAGYL) 500 mg in NaCl 100 mL IVPB premix     Order Specific Question:   Antimicrobial Indications     Answer:   Intra-Abdominal Infection       DDX: Acute cholecystitis, acute pancreatitis, choledocholithiasis, common bile duct obstruction    Initial MDM/Plan: 80 y.o. female who presents with abdominal pain and findings concerning for acute cholecystitis and acute pancreatitis. Patient was a transfer from outside facility. Elevated LFTs elevated lipase and CT findings suggestive of pancreatitis and cholecystitis. We will plan for pain control, EKG and GEN surge consult. DIAGNOSTIC RESULTS / EMERGENCYDEPARTMENT COURSE / MDM     LABS:  Labs Reviewed - No data to display      RADIOLOGY:  No results found. CT abdomen pelvis from outside facility performed at 8:44 PM showing dilation and wall thickening involving the gallbladder. Acute cholecystitis is not excluded, prominent caliber of the common bile duct and intrahepatic biliary tree which is new since August concerning for obstruction. Fat stranding around the pancreas may be related to acute pancreatitis    EKG  Sinus rhythm 76, normal intervals, normal axis, no acute ST or T wave changes    All EKG's are interpreted by the Emergency Department Physicianwho either signs or Co-signs this chart in the absence of a cardiologist.    EMERGENCY DEPARTMENT COURSE:  ED Course as of 01/18/22 0036   Mon Jan 17, 2022   2341 Pt seen and evaluated  [CD]   8648 Lab reviewed from outside facility AST//363.  Alk Phos 148, T bili 2.2 and lipase 11,893 [CD]   Tue Jan 18, 2022   0018 Surgery at bedside, recommending antibiotics and admission to medicine. [CD]   0035 Spoke to [facility] for admission. [CD]      ED Course User Index  [CD] Emre Dahl DO          PROCEDURES:  None    CONSULTS:  IP CONSULT TO GENERAL SURGERY  IP CONSULT TO HOSPITALIST    CRITICAL CARE:  Please see attending documentation    FINAL IMPRESSION      1. Acute pancreatitis, unspecified complication status, unspecified pancreatitis type          DISPOSITION / PLAN     DISPOSITION    Admit to [facility]     PATIENT REFERRED TO:  No follow-up provider specified.     DISCHARGE MEDICATIONS:  New Prescriptions    No medications on file       Emre Dahl DO  Emergency Medicine Resident    (Please note that portions of this note were completed with a voice recognition program.Efforts were made to edit the dictations but occasionally words are mis-transcribed.)        Emre Dahl DO  Resident  01/18/22 1212

## 2022-01-18 NOTE — CARE COORDINATION
Case Management Initial Discharge Plan  Sathish Queen,             Called delilah Pérez  to discuss discharge plans, said patient is usually independently with ambulation   Information verified: address, contacts, phone number, , insurance Yes  Insurance Provider: medicare    Emergency Contact/Next of Kin name & number: delilah Pérez  Who are involved in patient's support system? Family     PCP: Bryanna De Oliveira DO  Date of last visit:       Discharge Planning    Living Arrangements:    lives with  Braden Gustafson and son Greg Celaya has 2 stories  ramp to get into door, Location of bedroom/bathroom in home 1st    Patient able to perform ADL's:Assisted    Current Services (outpatient & in home)assisted  DME equipment: none  DME provider: none    Is patient receiving oral anticoagulation therapy? No home medications listed     Potential Assistance Needed:   home care    Patient agreeable to home care: Yes  Freedom of choice provided:  yes, [facility] (already coming in for )    Prior SNF/Rehab Placement and Facility: [facility] Services Evaluation: no    Patient expects to be discharged to:   home    If home: is the family and/or caregiver wiling & able to provide support at home? yes  Who will be providing this support? Family       Transportation provider: family   Transportation arrangements needed for discharge: No    Readmission Risk              Risk of Unplanned Readmission:  11             Does patient have a readmission risk score greater than 14?: No  If yes, follow-up appointment must be made within 7 days of discharge.      Goals of Care: safe transition plan       Educated yes on transitional options, provided freedom of choice and are agreeable with plan      Discharge Plan: return home with family referral to [facility]          Electronically signed by Annel Mendoza RN on 22 at 4:46 PM EST    17:06 call from ayde with [facility] can accept

## 2022-01-18 NOTE — ED NOTES
Report given to Philip Dunne RN. Opportunities for questions offered.       Fany Hernandez RN  01/18/22 3220

## 2022-01-18 NOTE — ED NOTES
RN placed blankets under patients legs to help with hell discomfort. Noted that urine that is draining from proctor appears dark.       Birdie Sow RN  01/18/22 6576

## 2022-01-18 NOTE — ED NOTES
Bed: 03  Expected date:   Expected time:   Means of arrival:   Comments:  Timothy Carlos RN  01/17/22 4317

## 2022-01-18 NOTE — ED NOTES
Writer RN introduced self to patient. Pt denies any needs at this time. Pt remains in trendelenburg position. No acute distress noted.       Latrice Chávez RN  01/18/22 8096

## 2022-01-18 NOTE — CONSULTS
Home medications:  Prior to Admission medications    Not on File     . Current Medications:  Scheduled Meds:   morphine  2 mg IntraVENous Once    sodium chloride flush  5-40 mL IntraVENous 2 times per day    enoxaparin  40 mg SubCUTAneous Daily    atorvastatin  40 mg Oral Nightly    [Held by provider] losartan  100 mg Oral Daily    pantoprazole  40 mg Oral QAM AC    lactated ringers bolus  500 mL IntraVENous Once    midodrine  2.5 mg Oral TID WC    piperacillin-tazobactam  3,375 mg IntraVENous Q8H     . Continuous Infusions:   sodium chloride      lactated ringers       . PRN Meds:sodium chloride flush, sodium chloride, potassium chloride, magnesium sulfate, acetaminophen, HYDROcodone 5 mg - acetaminophen **OR** HYDROcodone 5 mg - acetaminophen, HYDROmorphone **OR** HYDROmorphone, ondansetron **OR** ondansetron    REVIEW OF SYSTEMS:     Constitutional: No fever, no chills, no lethargy, no weakness, no weight loss  HEENT:  No headache, otalgia, itchy eyes, nasal discharge or sore throat. Cardiac:  No chest pain, dyspnea, orthopnea or PND. Chest:   No cough, phlegm or wheezing. Abdomen:   nausea, vomiting,   Neuro:  No focal weakness, abnormal movements or seizure like activity. Skin:   No rashes, no itching. :   No hematuria, no pyuria, no dysuria, no flank pain. Extremities:  No swelling or joint pains. ROS was otherwise negative except as mentioned in the [address] PHYSICAL EXAM:    BP (!) 91/41   Pulse 65   Temp 98 °F (36.7 °C) (Oral)   Resp 18   Ht 5' 4\" (1.626 m)   Wt 145 lb (65.8 kg)   SpO2 98%   BMI 24.89 kg/m²   . TMAX[24]    General: Well developed, Well nourished, No apparent distress  Head:  Normocephalic, Atraumatic  EENT: EOMI, Sclera not icteric, Oropharynx moist  Neck:  Supple, Trachea midline  Lungs:CTA Bilaterally  Heart: RRR, No murmur, No rub, No gallop, PMI nondisplaced. Abdomen:Soft, Non tender, Not distended, BS WNL,  No masses.  No hepatomegalia   Ext:No clubbing. No cyanosis. No edema. Skin: No rashes. No jaundice. No stigmata of liver disease. Neuro:  A&O x Two, ?? Dementia    Imaging:       Hemotological labs: Anemia studies:  No results for input(s): LABIRON, TIBC, FERRITIN, [id number], FOLATE, OCCULTBLD in the last 72 hours. CBC:  Recent Labs     01/18/22 0444   WBC 18.5*   HGB 12.5   MCV 90.7   RDW 15.7*          PT/INR:  Recent Labs     01/18/22  0444   PROTIME 11.3   INR 1.1       BMP:  Recent Labs     01/18/22 0444      K 3.5*      CO2 22   BUN 17   CREATININE 0.79   GLUCOSE 151*   CALCIUM 8.3*       Liver work up:  Hepatitis Functional Panel:  Recent Labs     01/18/22 0444   ALKPHOS 185*   *   AST 1,139*   PROT 6.0*   BILITOT 2.92*   LABALBU 3.3*       Amylase/Lipase/Ammonia:  Recent Labs     01/18/22 0444   AMYLASE 646*   LIPASE 1,473*       Acute Hepatitis Panel:  No results found for: HEPBSAG, HEPCAB, HEPBIGM, HEPAIGM         Principal Problem:    Cholecystitis  Active Problems:    Acute biliary pancreatitis    Essential hypertension  Resolved Problems:    * No resolved hospital problems. *       GI Impression and recommendations and plan:    17-year-old female with suspected acute cholecystitis, gallbladder pancreatitis and possible, biliary obstruction. -CT scan showed distended gallbladder 5 cm hyperdense material within the fundus possible stone versus sludge and fat stranding in pancreas. -Gallbladder ultrasound shows cholelithiasis with significant gallbladder wall edema  -No hx of alcohol use, triglycerides WNL,      1. 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 Pt will need repeat BMP at noon to monitor potassium level  3. Rec proctor cathter to monitor strict intake and out take given pt needs aggressive IVF, IV antibiotics and renal status needs to be monitored closely  4. Rec daily CBC, BMP, LFT.    5. Agree with antibiotics   6. Complete POC to follow HIDA results    This plan was formulated in collaboration with Dr. Jamaal Edwards MD      Electronically signed by:  Shiela Canchola  Gastroenterology  [phone]  1/18/2022    9:09 AM     This note was created with the assistance of a speech-recognition program.  Although the intention is to generate a document that actually reflects the content of the visit, no guarantees can be provided that every mistake has been identified and corrected by editing.

## 2022-01-19 ENCOUNTER — ANESTHESIA EVENT (OUTPATIENT)
Dept: OPERATING ROOM | Age: 87
DRG: 871 | End: 2022-01-19
Payer: MEDICARE

## 2022-01-19 ENCOUNTER — ANESTHESIA (OUTPATIENT)
Dept: OPERATING ROOM | Age: 87
DRG: 871 | End: 2022-01-19
Payer: MEDICARE

## 2022-01-19 VITALS — OXYGEN SATURATION: 98 % | TEMPERATURE: 96 F | DIASTOLIC BLOOD PRESSURE: 72 MMHG | SYSTOLIC BLOOD PRESSURE: 151 MMHG

## 2022-01-19 LAB
ABSOLUTE EOS #: 0.08 K/UL (ref 0–0.44)
ABSOLUTE IMMATURE GRANULOCYTE: 0.04 K/UL (ref 0–0.3)
ABSOLUTE LYMPH #: 1.87 K/UL (ref 1.1–3.7)
ABSOLUTE MONO #: 0.64 K/UL (ref 0.1–1.2)
ALBUMIN SERPL-MCNC: 2.6 G/DL (ref 3.5–5.2)
ALBUMIN/GLOBULIN RATIO: 1 (ref 1–2.5)
ALP BLD-CCNC: 189 U/L (ref 35–104)
ALT SERPL-CCNC: 572 U/L (ref 5–33)
ANION GAP SERPL CALCULATED.3IONS-SCNC: 8 MMOL/L (ref 9–17)
AST SERPL-CCNC: 313 U/L
BASOPHILS # BLD: 0 % (ref 0–2)
BASOPHILS ABSOLUTE: 0.04 K/UL (ref 0–0.2)
BILIRUB SERPL-MCNC: 2.19 MG/DL (ref 0.3–1.2)
BUN BLDV-MCNC: 20 MG/DL (ref 8–23)
BUN/CREAT BLD: ABNORMAL (ref 9–20)
CALCIUM SERPL-MCNC: 8.1 MG/DL (ref 8.6–10.4)
CHLORIDE BLD-SCNC: 107 MMOL/L (ref 98–107)
CO2: 22 MMOL/L (ref 20–31)
CREAT SERPL-MCNC: 0.73 MG/DL (ref 0.5–0.9)
DIFFERENTIAL TYPE: ABNORMAL
EOSINOPHILS RELATIVE PERCENT: 1 % (ref 1–4)
GFR AFRICAN AMERICAN: >60 ML/MIN
GFR NON-AFRICAN AMERICAN: >60 ML/MIN
GFR SERPL CREATININE-BSD FRML MDRD: ABNORMAL ML/MIN/{1.73_M2}
GFR SERPL CREATININE-BSD FRML MDRD: ABNORMAL ML/MIN/{1.73_M2}
GLUCOSE BLD-MCNC: 104 MG/DL (ref 70–99)
HCT VFR BLD CALC: 34.4 % (ref 36.3–47.1)
HEMOGLOBIN: 11.1 G/DL (ref 11.9–15.1)
IMMATURE GRANULOCYTES: 0 %
INR BLD: 1.2
LIPASE: 239 U/L (ref 13–60)
LYMPHOCYTES # BLD: 16 % (ref 24–43)
MCH RBC QN AUTO: 30.4 PG (ref 25.2–33.5)
MCHC RBC AUTO-ENTMCNC: 32.3 G/DL (ref 28.4–34.8)
MCV RBC AUTO: 94.2 FL (ref 82.6–102.9)
MONOCYTES # BLD: 6 % (ref 3–12)
NRBC AUTOMATED: 0 PER 100 WBC
PDW BLD-RTO: 16.4 % (ref 11.8–14.4)
PLATELET # BLD: 145 K/UL (ref 138–453)
PLATELET ESTIMATE: ABNORMAL
PMV BLD AUTO: 11.6 FL (ref 8.1–13.5)
POTASSIUM SERPL-SCNC: 3.8 MMOL/L (ref 3.7–5.3)
PROTHROMBIN TIME: 12.3 SEC (ref 9.1–12.3)
RBC # BLD: 3.65 M/UL (ref 3.95–5.11)
RBC # BLD: ABNORMAL 10*6/UL
SEG NEUTROPHILS: 77 % (ref 36–65)
SEGMENTED NEUTROPHILS ABSOLUTE COUNT: 9.04 K/UL (ref 1.5–8.1)
SODIUM BLD-SCNC: 137 MMOL/L (ref 135–144)
TOTAL PROTEIN: 5.3 G/DL (ref 6.4–8.3)
WBC # BLD: 11.7 K/UL (ref 3.5–11.3)
WBC # BLD: ABNORMAL 10*3/UL

## 2022-01-19 PROCEDURE — 6360000002 HC RX W HCPCS

## 2022-01-19 PROCEDURE — 0FBG8ZX EXCISION OF PANCREAS, VIA NATURAL OR ARTIFICIAL OPENING ENDOSCOPIC, DIAGNOSTIC: ICD-10-PCS | Performed by: INTERNAL MEDICINE

## 2022-01-19 PROCEDURE — 3609020800 HC EGD W/EUS FNA: Performed by: INTERNAL MEDICINE

## 2022-01-19 PROCEDURE — 7100000001 HC PACU RECOVERY - ADDTL 15 MIN: Performed by: INTERNAL MEDICINE

## 2022-01-19 PROCEDURE — 99232 SBSQ HOSP IP/OBS MODERATE 35: CPT | Performed by: INTERNAL MEDICINE

## 2022-01-19 PROCEDURE — 36415 COLL VENOUS BLD VENIPUNCTURE: CPT

## 2022-01-19 PROCEDURE — 83690 ASSAY OF LIPASE: CPT

## 2022-01-19 PROCEDURE — 6360000002 HC RX W HCPCS: Performed by: INTERNAL MEDICINE

## 2022-01-19 PROCEDURE — 2720000010 HC SURG SUPPLY STERILE: Performed by: INTERNAL MEDICINE

## 2022-01-19 PROCEDURE — 7100000000 HC PACU RECOVERY - FIRST 15 MIN: Performed by: INTERNAL MEDICINE

## 2022-01-19 PROCEDURE — 2500000003 HC RX 250 WO HCPCS

## 2022-01-19 PROCEDURE — 2580000003 HC RX 258: Performed by: INTERNAL MEDICINE

## 2022-01-19 PROCEDURE — 88172 CYTP DX EVAL FNA 1ST EA SITE: CPT

## 2022-01-19 PROCEDURE — 2580000003 HC RX 258: Performed by: NURSE PRACTITIONER

## 2022-01-19 PROCEDURE — 2580000003 HC RX 258

## 2022-01-19 PROCEDURE — 1200000000 HC SEMI PRIVATE

## 2022-01-19 PROCEDURE — 85025 COMPLETE CBC W/AUTO DIFF WBC: CPT

## 2022-01-19 PROCEDURE — 2709999900 HC NON-CHARGEABLE SUPPLY: Performed by: INTERNAL MEDICINE

## 2022-01-19 PROCEDURE — 85610 PROTHROMBIN TIME: CPT

## 2022-01-19 PROCEDURE — 3700000000 HC ANESTHESIA ATTENDED CARE: Performed by: INTERNAL MEDICINE

## 2022-01-19 PROCEDURE — 80053 COMPREHEN METABOLIC PANEL: CPT

## 2022-01-19 PROCEDURE — 88307 TISSUE EXAM BY PATHOLOGIST: CPT

## 2022-01-19 PROCEDURE — 88173 CYTOPATH EVAL FNA REPORT: CPT

## 2022-01-19 PROCEDURE — 3700000001 HC ADD 15 MINUTES (ANESTHESIA): Performed by: INTERNAL MEDICINE

## 2022-01-19 PROCEDURE — 3609017100 HC EGD: Performed by: INTERNAL MEDICINE

## 2022-01-19 RX ORDER — ROCURONIUM BROMIDE 10 MG/ML
INJECTION, SOLUTION INTRAVENOUS PRN
Status: DISCONTINUED | OUTPATIENT
Start: 2022-01-19 | End: 2022-01-19 | Stop reason: SDUPTHER

## 2022-01-19 RX ORDER — ONDANSETRON 2 MG/ML
INJECTION INTRAMUSCULAR; INTRAVENOUS PRN
Status: DISCONTINUED | OUTPATIENT
Start: 2022-01-19 | End: 2022-01-19 | Stop reason: SDUPTHER

## 2022-01-19 RX ORDER — FENTANYL CITRATE 50 UG/ML
50 INJECTION, SOLUTION INTRAMUSCULAR; INTRAVENOUS EVERY 5 MIN PRN
Status: DISCONTINUED | OUTPATIENT
Start: 2022-01-19 | End: 2022-01-19

## 2022-01-19 RX ORDER — FENTANYL CITRATE 50 UG/ML
25 INJECTION, SOLUTION INTRAMUSCULAR; INTRAVENOUS EVERY 5 MIN PRN
Status: DISCONTINUED | OUTPATIENT
Start: 2022-01-19 | End: 2022-01-19

## 2022-01-19 RX ORDER — SODIUM CHLORIDE, SODIUM LACTATE, POTASSIUM CHLORIDE, CALCIUM CHLORIDE 600; 310; 30; 20 MG/100ML; MG/100ML; MG/100ML; MG/100ML
INJECTION, SOLUTION INTRAVENOUS CONTINUOUS PRN
Status: DISCONTINUED | OUTPATIENT
Start: 2022-01-19 | End: 2022-01-19 | Stop reason: SDUPTHER

## 2022-01-19 RX ORDER — GLYCOPYRROLATE 1 MG/5 ML
SYRINGE (ML) INTRAVENOUS PRN
Status: DISCONTINUED | OUTPATIENT
Start: 2022-01-19 | End: 2022-01-19 | Stop reason: SDUPTHER

## 2022-01-19 RX ORDER — SODIUM CHLORIDE, SODIUM LACTATE, POTASSIUM CHLORIDE, CALCIUM CHLORIDE 600; 310; 30; 20 MG/100ML; MG/100ML; MG/100ML; MG/100ML
INJECTION, SOLUTION INTRAVENOUS CONTINUOUS
Status: DISCONTINUED | OUTPATIENT
Start: 2022-01-19 | End: 2022-01-20

## 2022-01-19 RX ORDER — LIDOCAINE HYDROCHLORIDE 10 MG/ML
INJECTION, SOLUTION EPIDURAL; INFILTRATION; INTRACAUDAL; PERINEURAL PRN
Status: DISCONTINUED | OUTPATIENT
Start: 2022-01-19 | End: 2022-01-19 | Stop reason: SDUPTHER

## 2022-01-19 RX ORDER — PHENYLEPHRINE HCL IN 0.9% NACL 1 MG/10 ML
SYRINGE (ML) INTRAVENOUS PRN
Status: DISCONTINUED | OUTPATIENT
Start: 2022-01-19 | End: 2022-01-19 | Stop reason: SDUPTHER

## 2022-01-19 RX ORDER — LOSARTAN POTASSIUM 100 MG/1
100 TABLET ORAL DAILY
COMMUNITY

## 2022-01-19 RX ORDER — ATORVASTATIN CALCIUM 40 MG/1
40 TABLET, FILM COATED ORAL DAILY
COMMUNITY

## 2022-01-19 RX ORDER — FENTANYL CITRATE 50 UG/ML
INJECTION, SOLUTION INTRAMUSCULAR; INTRAVENOUS PRN
Status: DISCONTINUED | OUTPATIENT
Start: 2022-01-19 | End: 2022-01-19 | Stop reason: SDUPTHER

## 2022-01-19 RX ORDER — PROPOFOL 10 MG/ML
INJECTION, EMULSION INTRAVENOUS PRN
Status: DISCONTINUED | OUTPATIENT
Start: 2022-01-19 | End: 2022-01-19 | Stop reason: SDUPTHER

## 2022-01-19 RX ORDER — NEOSTIGMINE METHYLSULFATE 5 MG/5 ML
SYRINGE (ML) INTRAVENOUS PRN
Status: DISCONTINUED | OUTPATIENT
Start: 2022-01-19 | End: 2022-01-19 | Stop reason: SDUPTHER

## 2022-01-19 RX ORDER — PANTOPRAZOLE SODIUM 40 MG/1
40 TABLET, DELAYED RELEASE ORAL DAILY
COMMUNITY

## 2022-01-19 RX ORDER — DEXAMETHASONE SODIUM PHOSPHATE 10 MG/ML
INJECTION INTRAMUSCULAR; INTRAVENOUS PRN
Status: DISCONTINUED | OUTPATIENT
Start: 2022-01-19 | End: 2022-01-19 | Stop reason: SDUPTHER

## 2022-01-19 RX ADMIN — SODIUM CHLORIDE, POTASSIUM CHLORIDE, SODIUM LACTATE AND CALCIUM CHLORIDE: 600; 310; 30; 20 INJECTION, SOLUTION INTRAVENOUS at 20:42

## 2022-01-19 RX ADMIN — Medication 100 MCG: at 15:14

## 2022-01-19 RX ADMIN — SODIUM CHLORIDE, PRESERVATIVE FREE 10 ML: 5 INJECTION INTRAVENOUS at 09:00

## 2022-01-19 RX ADMIN — ONDANSETRON 4 MG: 2 INJECTION INTRAMUSCULAR; INTRAVENOUS at 15:54

## 2022-01-19 RX ADMIN — FENTANYL CITRATE 50 MCG: 50 INJECTION, SOLUTION INTRAMUSCULAR; INTRAVENOUS at 15:27

## 2022-01-19 RX ADMIN — Medication 0.6 MG: at 15:56

## 2022-01-19 RX ADMIN — Medication 50 MCG: at 15:41

## 2022-01-19 RX ADMIN — DEXAMETHASONE SODIUM PHOSPHATE 10 MG: 10 INJECTION INTRAMUSCULAR; INTRAVENOUS at 15:11

## 2022-01-19 RX ADMIN — SODIUM CHLORIDE, POTASSIUM CHLORIDE, SODIUM LACTATE AND CALCIUM CHLORIDE: 600; 310; 30; 20 INJECTION, SOLUTION INTRAVENOUS at 13:07

## 2022-01-19 RX ADMIN — PIPERACILLIN AND TAZOBACTAM 3375 MG: 3; .375 INJECTION, POWDER, FOR SOLUTION INTRAVENOUS at 01:16

## 2022-01-19 RX ADMIN — Medication 3.5 MG: at 15:56

## 2022-01-19 RX ADMIN — PIPERACILLIN AND TAZOBACTAM 3375 MG: 3; .375 INJECTION, POWDER, FOR SOLUTION INTRAVENOUS at 14:46

## 2022-01-19 RX ADMIN — LIDOCAINE HYDROCHLORIDE 50 MG: 10 INJECTION, SOLUTION EPIDURAL; INFILTRATION; INTRACAUDAL; PERINEURAL at 14:54

## 2022-01-19 RX ADMIN — SUGAMMADEX 100 MG: 100 INJECTION, SOLUTION INTRAVENOUS at 16:02

## 2022-01-19 RX ADMIN — SUGAMMADEX 100 MG: 100 INJECTION, SOLUTION INTRAVENOUS at 16:03

## 2022-01-19 RX ADMIN — SODIUM CHLORIDE, POTASSIUM CHLORIDE, SODIUM LACTATE AND CALCIUM CHLORIDE: 600; 310; 30; 20 INJECTION, SOLUTION INTRAVENOUS at 15:00

## 2022-01-19 RX ADMIN — Medication 50 MCG: at 15:33

## 2022-01-19 RX ADMIN — PROPOFOL 80 MG: 10 INJECTION, EMULSION INTRAVENOUS at 14:54

## 2022-01-19 RX ADMIN — ROCURONIUM BROMIDE 50 MG: 10 INJECTION INTRAVENOUS at 14:54

## 2022-01-19 RX ADMIN — PIPERACILLIN AND TAZOBACTAM 3375 MG: 3; .375 INJECTION, POWDER, FOR SOLUTION INTRAVENOUS at 09:35

## 2022-01-19 ASSESSMENT — ENCOUNTER SYMPTOMS
NAUSEA: 1
RHINORRHEA: 0
ABDOMINAL PAIN: 0
EYE REDNESS: 0
VOMITING: 1
COLOR CHANGE: 0
SHORTNESS OF BREATH: 0
COUGH: 0

## 2022-01-19 ASSESSMENT — PULMONARY FUNCTION TESTS
PIF_VALUE: 14
PIF_VALUE: 22
PIF_VALUE: 21
PIF_VALUE: 6
PIF_VALUE: 1
PIF_VALUE: 17
PIF_VALUE: 21
PIF_VALUE: 21
PIF_VALUE: 23
PIF_VALUE: 21
PIF_VALUE: 21
PIF_VALUE: 14
PIF_VALUE: 0
PIF_VALUE: 22
PIF_VALUE: 26
PIF_VALUE: 21
PIF_VALUE: 20
PIF_VALUE: 3
PIF_VALUE: 22
PIF_VALUE: 14
PIF_VALUE: 14
PIF_VALUE: 22
PIF_VALUE: 21
PIF_VALUE: 14
PIF_VALUE: 14
PIF_VALUE: 22
PIF_VALUE: 21
PIF_VALUE: 21
PIF_VALUE: 20
PIF_VALUE: 20
PIF_VALUE: 21
PIF_VALUE: 21
PIF_VALUE: 3
PIF_VALUE: 17
PIF_VALUE: 3
PIF_VALUE: 3
PIF_VALUE: 0
PIF_VALUE: 23
PIF_VALUE: 2
PIF_VALUE: 21
PIF_VALUE: 17
PIF_VALUE: 23
PIF_VALUE: 20
PIF_VALUE: 21
PIF_VALUE: 20
PIF_VALUE: 18
PIF_VALUE: 2
PIF_VALUE: 0
PIF_VALUE: 1
PIF_VALUE: 23
PIF_VALUE: 23
PIF_VALUE: 2
PIF_VALUE: 1
PIF_VALUE: 21
PIF_VALUE: 4
PIF_VALUE: 21
PIF_VALUE: 1
PIF_VALUE: 21
PIF_VALUE: 0
PIF_VALUE: 1
PIF_VALUE: 21
PIF_VALUE: 21
PIF_VALUE: 17
PIF_VALUE: 20
PIF_VALUE: 10
PIF_VALUE: 21
PIF_VALUE: 22
PIF_VALUE: 21
PIF_VALUE: 21

## 2022-01-19 NOTE — PLAN OF CARE
Pt daughter at bedside, MD at bedside explained procedure. DPOA paperwork on file in chart, Pt remains full code .

## 2022-01-19 NOTE — PROGRESS NOTES
[facility]  Office: [address], DO, Cady Mane, DO, Almargarita [place], DO, León Coypadma Blood, DO, Guilherme Sosa MD, Romelia Marinelli MD, Tristan Dockery MD, Mary Grace Perez MD, Armando Narvaez MD, Chetan Cline MD, Kaitlin Vela MD, Dayne Lomax, DO, Lew Neil, DO, Lia Robins MD,  Johann Galeazzi, DO, Kirby Chao MD, Jesus Manuel Camargo MD, Igor Gasca MD, Tung Roldan MD, Denise Baird MD, Felipe Barraza MD, Hina Kramer MD, Stephan Morrow [facility], [facility], CNP, Amber King, CNP, Kaushik Zhu, CNS, Jamilah Spears, CNP, Chen Pro, CNP, Adele Mirza, CNP, Adan Barrow, CNP, Riri Sargent CNP, Carolin Armendariz PA-C, Ermelinda Edwards [facility], Steve Whipple [facility], Torrie Grubbs CNP, Dawn Barnett, CNP, Ngozi Maria CNP, Latia Lala CNP, Miko Ledesma CNP, Yfn [place], [address] [place] [address]    Progress Note    1/19/2022    1:51 PM    Name:   Jarocho Oden  MRN:     [id number]     Acct:      [de-identified]   Room:   [address] [place] [address] Day:  1  Admit Date:  1/17/2022 11:22 PM    PCP:   Elaine Briones DO  Code Status:  Full Code    Subjective:     C/C:   Chief Complaint   Patient presents with    Abdominal Pain     Interval History Status: improved. Patient does look more comfortable today. Patient's daughter and granddaughter at bedside. Updated on plan of care. Patient has any chest pain, shortness of breath. She is having some slight abdominal pain. No fevers or chills overnight. Brief History: This is a 5year-old female who initially presented to hospital with right upper quadrant abdominal pain. AST 1139, , alk phos 185, bilirubin 2.92. Ultrasound of the gallbladder showed cholelithiasis with significant gallbladder wall edema. HIDA scan showed findings consistent with cholecystitis. General surgery was consulted, plan for cholecystectomy prior to discharge.   Otherwise, patient underwent EUS on 1/19/2022 which showed: Padmini Chaney Patient pancreatitis that improved with supportive care/treatment per protocol    Review of Systems:     Constitutional:  negative for chills, fevers, sweats  Respiratory:  negative for cough, dyspnea on exertion, shortness of breath, wheezing  Cardiovascular:  negative for chest pain, chest pressure/discomfort, lower extremity edema, palpitations  Gastrointestinal:  negative for abdominal pain, constipation, diarrhea, nausea, vomiting  Neurological:  negative for dizziness, headache    Medications: Allergies: Allergies   Allergen Reactions    Adhesive Tape        Current Meds:   Scheduled Meds:    morphine  2 mg IntraVENous Once    sodium chloride flush  5-40 mL IntraVENous 2 times per day    [Held by provider] enoxaparin  40 mg SubCUTAneous Daily    atorvastatin  40 mg Oral Nightly    [Held by provider] losartan  100 mg Oral Daily    pantoprazole  40 mg Oral QAM AC    midodrine  2.5 mg Oral TID WC    piperacillin-tazobactam  3,375 mg IntraVENous Q8H     Continuous Infusions:    lactated ringers 125 mL/hr at 01/19/22 1307    sodium chloride       PRN Meds: sodium chloride flush, sodium chloride, potassium chloride, magnesium sulfate, acetaminophen, HYDROcodone 5 mg - acetaminophen **OR** HYDROcodone 5 mg - acetaminophen, HYDROmorphone **OR** HYDROmorphone, ondansetron **OR** ondansetron    Data:     Past Medical History:   has a past medical history of Essential hypertension, Hyperlipidemia, and Pre-diabetes. Social History:   reports that she has never smoked. She has never used smokeless tobacco. She reports that she does not drink alcohol and does not use drugs.      Family History:   Family History   Problem Relation Age of Onset    No Known Problems Mother     No Known Problems Father        Vitals:  /62   Pulse 67   Temp 99.1 °F (37.3 °C) (Oral)   Resp 16   Ht 5' 4\" (1.626 m)   Wt 145 lb (65.8 kg)   SpO2 96%   BMI 24.89 kg/m² Temp (24hrs), Av.9 °F (37.2 °C), Min:98.6 °F (37 °C), Max:99.1 °F (37.3 °C)    No results for input(s): POCGLU in the last 72 hours. I/O (24Hr): Intake/Output Summary (Last 24 hours) at 2022 1351  Last data filed at 2022 0900  Gross per 24 hour   Intake 1110 ml   Output 200 ml   Net 910 ml       Labs:  Hematology:  Recent Labs     22   WBC 18.5* 11.7*   RBC 4.19 3.65*   HGB 12.5 11.1*   HCT 38.0 34.4*   MCV 90.7 94.2   MCH 29.8 30.4   MCHC 32.9 32.3   RDW 15.7* 16.4*    145   MPV 11.1 11.6   INR 1.1 1.2     Chemistry:  Recent Labs     22  1129 22    142 137   K 3.5* 4.6 3.8    109* 107   CO2 22 23 22   GLUCOSE 151* 121* 104*   BUN 17 20 20   CREATININE 0.79 0.75 0.73   MG 1.4*  --   --    ANIONGAP 12 10 8*   LABGLOM >60 >60 >60   GFRAA >60 >60 >60   CALCIUM 8.3* 8.3* 8.1*   CAION 1.12*  --   --    PHOS 2.8  --   --      Recent Labs     22   PROT 6.0* 5.3*   LABALBU 3.3* 2.6*   AST 1,139* 313*   * 572*   ALKPHOS 185* 189*   BILITOT 2.92* 2.19*   AMYLASE 646*  --    LIPASE 1,473* 239*   TRIG 55  --      ABG:No results found for: POCPH, PHART, PH, POCPCO2, WBM8FWV, PCO2, POCPO2, PO2ART, PO2, POCHCO3, LBF0MRK, HCO3, NBEA, PBEA, BEART, BE, THGBART, THB, RQK0NZF, TYQE0CXE, W8BIJCCD, O2SAT, FIO2  Lab Results   Component Value Date/Time    SPECIAL  L HAND 3ML 2022 09:37 AM     Lab Results   Component Value Date/Time    CULTURE NO GROWTH 1 DAY 2022 09:37 AM       Radiology:  NM HEPATOBILIARY    Result Date: 2022  No excretion of contrast into the extrahepatic biliary tree or small bowel at 2 hours. Gallbladder is small bowel or visualized at 18 hours, delayed visualization compatible with chronic cholecystitis. RECOMMENDATIONS: Unavailable     US GALLBLADDER RUQ    Result Date: 2022  1.  Cholelithiases with significant gallbladder wall edema can be seen in the setting of pancreatitis, hepatitis or acute cholecystitis. 2. Trace amount of fluid in Man's pouch. Physical Examination:        General appearance:  alert, cooperative and no distress  Mental Status:  oriented to person, place and time and normal affect  Lungs:  clear to auscultation bilaterally, normal effort  Heart:  regular rate and rhythm, no murmur  Abdomen:  soft, nontender, nondistended, normal bowel sounds, no masses, hepatomegaly, splenomegaly has some mild abdominal pain improving since yesterday  Extremities:  no edema, redness, tenderness in the calves  Skin:  no gross lesions, rashes, induration    Assessment:        Hospital Problems           Last Modified POA    * (Principal) Ascending cholangitis 1/18/2022 Yes    Cholecystitis 1/18/2022 Yes    Gallstone pancreatitis 1/18/2022 Yes    Essential hypertension 1/18/2022 Yes    Sepsis ([address] 1/18/2022 Yes    Acute pancreatitis 1/18/2022 Yes          Plan:        1. Sepsis 2/2 Ascending cholangitis from Common bile duct obstruction from gallstones: GI consulted, planning for ERCP today 1/19/22. Continue antibiotics. Continue IV fluids. Blood cultures ordered and pending. ID consulted  2. Gallstone pancreatitis: Continue IV fluids, goal urine output 0.5 to 1 cc/h. Monitor for volume overload. N.p.o., advance diet once pain improves. 3. Acute cholecystitis: Confirmed via HIDA scan. General surgery consulted for cholecystectomy-plan for this admission. 4. DVT ppx  5. PT/OT  6.  Spoke with nurse: will get med claudio Burnett DO  1/19/2022  1:51 PM

## 2022-01-19 NOTE — PLAN OF CARE
Pt family [facility]. Previously pt had no proctor catheter and lived at home w brother.

## 2022-01-19 NOTE — PROGRESS NOTES
PROGRESS NOTE      PATIENT NAME: Sulma Ansari  MEDICAL RECORD NO. [id number]  DATE: 2022  SURGEON: Dr. He Abts: Juve Zaragoza DO    HD: # 1    ASSESSMENT    Patient Active Problem List   Diagnosis    Cholecystitis    Gallstone pancreatitis    Essential hypertension    Sepsis ([facility] [address]    Ascending cholangitis    Acute pancreatitis       MEDICAL DECISION MAKING AND PLAN    Medical management per primary  GI: HIDA scan suggestive of chronic cholecystitis. Plan for ERCP this afternoon  Patient would benefit from laparoscopic cholecystectomy this admission. Following ERCP will discuss risks and benefits of the procedure. SUBJECTIVE    Sulma Ansari is has improved since yesterday. Denies abdominal pain, nausea or vomiting. Liver enzymes significantly improved. OBJECTIVE  VITALS: Temp: Temp: 98.6 °F (37 °C)Temp  Av.6 °F (37 °C)  Min: 98.6 °F (37 °C)  Max: 98.6 °F (37 °C) BP Systolic (03LXI), YWK:676 , Min:111 , TW   Diastolic (89VIS), YOT:74, Min:57, Max:57   Pulse Pulse  Av  Min: 65  Max: 65 Resp No data recorded Pulse ox SpO2  Av %  Min: 100 %  Max: 100 %    CONSTITUTIONAL: awake, alert, cooperative, no apparent distress  HEAD: atraumatic, normocephalic  EYES: sclera clear, pupils equal and reactive to light  ENT: ears are symmetric, nares patent   HEENT: moist mucous membranes  NECK: Supple, symmetrical, trachea midline  LUNGS: no respiratory distress, no audible wheezing  CARDIOVASCULAR: regular rate and rhythm  ABDOMEN: soft, nontender, nondistended, no guarding, no rebound tenderness, previous midline surgical scars   MUSCULOSKELETAL: full range of motion noted  NEUROLOGIC: Awake, alert, oriented to name, place and time  EXTREMITIES: peripheral pulses normal, no pedal edema, no calf tenderness  SKIN: normal coloration and turgor    I/O last 3 completed shifts: In: 1100 [P.O.:250; I.V.:850]  Out: 600 [Urine:600]    Drain/tube output:   In: 1110 [P.O.:250; I.V.:860]  Out: 200 [Urine:200]    LAB:  CBC:   Recent Labs     01/18/22  0444 01/19/22  0453   WBC 18.5* 11.7*   HGB 12.5 11.1*   HCT 38.0 34.4*   MCV 90.7 94.2    145     BMP:   Recent Labs     01/18/22  0444 01/18/22  1129 01/19/22  0453    142 137   K 3.5* 4.6 3.8    109* 107   CO2 22 23 22   BUN 17 20 20   CREATININE 0.79 0.75 0.73   GLUCOSE 151* 121* 104*     COAGS:   Recent Labs     01/18/22  0444 01/19/22  0453   PROT 6.0* 5.3*   INR 1.1 1.2       RADIOLOGY:  NM HEPATOBILIARY   Final Result   No excretion of contrast into the extrahepatic biliary tree or small bowel at   2 hours. Gallbladder is small bowel or visualized at 18 hours, delayed   visualization compatible with chronic cholecystitis. RECOMMENDATIONS:   Unavailable         US GALLBLADDER RUQ   Final Result   1. Cholelithiases with significant gallbladder wall edema can be seen in the   setting of pancreatitis, hepatitis or acute cholecystitis. 2. Trace amount of fluid in Man's pouch. Erasmo Rico MD  01/19/22, 9:43 AM         Attending Note      I have reviewed the above [facility] note(s) and I either performed the key elements of the medical history and physical exam or was present with the surgery resident when the key elements of the medical history and physical exam were performed.  I have discussed the findings, established the care plan and recommendations with the surgical team.  Await GI eval, possible lap kathi post GI eval.    Garrett Clark MD  1/19/2022  6:08 PM

## 2022-01-19 NOTE — OP NOTE
EGD/EUS      Patient:   Laquita Alanis    :    1931    Facility:   [facility]   Referring/PCP: Jessica Landeros DO    Procedure:   Esophagogastroduodenoscopy --diagnostic and Endoscopic ultrasound with FNB of Pancreatic tail mass. Date:     2022   Endoscopist:  Arminda Campos MD     Indication:   Gall stone pancreatitis with abnormal LFT's rule out Choledocholithiasis. Anesthesia:  MAC    Complications: None    Estimated blood loss: Minimal    Specimen collected: Yes    Description of Procedure:  Informed consent was obtained from the patient's son after explanation of the procedure including indications, description of the procedure,  benefits and possible risks and complications of the procedure, and alternatives. Questions were answered. The patient's history was reviewed and a directed physical examination was performed prior to the procedure. Patient was monitored throughout the procedure with pulse oximetry and periodic assessment of vital signs. Patient was sedated as noted above. With the patient in the left lateral decubitus position, the Olympus videoendoscope followed by endoechoendoscope was placed in the patient's mouth and under direct visualization passed into the esophagus. The scope was passed to the 2nd portion of the duodenum. The patient tolerated the procedure well and was taken to the recovery area in good condition. EGD Findings:  Esophagus: Normal.   Stomach: Multiple small sessile polyps seen in the body of the stomach. Normal stomach seen during retroflexion view   Duodenum: Large duodenal diverticula seen in the second portion of the duodenum. EUS findings:  Pancreas: A round mass was identified in the pancreatic tail. . The mass was hypoechoic, heterogenous and solid. The mass measured 10 mm by 12  mm in maximal cross-sectional diameter. The endosonographic borders were well-defined.  An intact interface was seen between the mass and the celiac trunk and superior mesenteric artery suggesting a lack of invasion. Fine needle biopsy was performed. Color Doppler imaging was utilized prior to needle puncture to confirm a lack of significant vascular structures within the needle path. Six passes were made with the 25 gauge ultrasound biopsy needle using a transgastric  approach. A stylet was used. A cytologist was present and performed a preliminary cytologic examination. Preliminary cytology is suspicious for malignancy (final results are pending). Estimated blood loss was minimal. Verification of patient identification for the specimen was done by the physician and nurse using the patient's name and medical record number. Rest of the pancreas appeared within normal limits. Examination of the uncinate process limited due to air artifacts from the large duodenal diverticula. PD with hyperechoic ductal walls. PD in the head 2.6 mm, in the body 1.2 mm and in the tail 1 mm in maximum cross sectional diameter. CBD: No stones, sludge. CBD diameter: 3 mm. Gallbladder: Thickened gall bladder wall up to 3 mm. Extensive sludge present in the gall bladder. Left adrenal: Normal.   Left lobe of liver: A round anechoic non septated cyst measuring 6 x 7 mm was seen in the left lobe of the liver    Lymphadenopathy: No pathologic lymphadenopathy seen in upper abdomen.        Recommendations: Stage T1 N0 MX applies if biopsies consistent with cancer                                      Further recommendations as per inpatient GI team     Electronically signed by Alina Seo MD  on 1/19/2022 at 3:58 PM

## 2022-01-19 NOTE — PLAN OF CARE
Pt returned from [address], Vitals stable.   Plan for EUS/ERCP today per GI NP.

## 2022-01-19 NOTE — PLAN OF CARE
GI plan of care    We will plan for EUS ERCP this afternoon  Please hold Lovenox   Keep patient n.p.o. Spoke with son Estefanía Farris, updated him with patient's condition and plan of care.   He is in agreement    Will follow closely  Ritchie Osborn, CNP

## 2022-01-19 NOTE — PROGRESS NOTES
Occupational [address]  Occupational Therapy Not Seen Note    DATE: 2022    NAME: Imer Black  MRN: [id number]   : 1931      Patient not seen this date for Occupational Therapy due to:    Surgery/Procedure: plan for EUS ERCP this afternoon    Next Scheduled Treatment: Attempt on  as appropriate.     Electronically signed by Ibis Steward OT on 2022 at 9:18 AM

## 2022-01-19 NOTE — PLAN OF CARE
Sent secure message about need for continued LR @125/hr. Pt tray will be arriving shortly. Pt still drowsy, but arousable . Vitals stable .

## 2022-01-19 NOTE — PROGRESS NOTES
[facility] -   Infectious diseases evaluation  admission date 1/17/2022    reason for consultation:   Ascending cholangitis    Impression :   Current:  · Ascending cholangitis   · Acute Cholecystitis w biliary obstruction  · Pancreatitis - likely gallstone   · Pancreatic tail tumor  · Leukocytosis     Other:  ·   Discussion / summary of stay / plan of care   ·   Recommendations     · On zosyn   ·  HIDA - acute kathi and biliary obst picture  · EUS ERCP 1/19 - pend biopsy of the tail of the pancreas   · cannot get MRI due to pacemaker not being compatible     Infection Control Recommendations   · Raymond Precautions      Antimicrobial Stewardship Recommendations   · Simplification of therapy  · Targeted therapy    Coordination ofOutpatient Care:   · Estimated Length of IV antimicrobials:  · Patient will need Midline / picc Catheter Insertion:   · Patient will need SNF:  · Patient will need outpatient wound care:     History of Present Illness:   Initial history:  Sathish Queen is a 80y.o.-year-old female who presented to the ED from [facility] for concerns of acute cholecystitis vs pancreatitis. She was having abd pain and nausea/vomiting with  and , elevated total bilirubin of 2.2, indirect bilirubin 1.2, lipase 60124, and WBC 11.5. CT abd pelvis w/ contrast showed dilation and wall thickening of the gallbladder and a dilated common bile duct with fat stranding around the pancreas. She was transferred here to see GI for possible ERCP and GS evaluation. On my exam ,after the HIDA. Pleasant confused and no abd pain or tenderness - no nausea or vomiting at this time  Not in distress  No cellultiis      Interval changes  1/19/2022   BP (!) 111/57   Pulse 65   Temp 98.6 °F (37 °C) (Oral)   Resp 18   Ht 5' 4\" (1.626 m)   Wt 145 lb (65.8 kg)   SpO2 100%   BMI 24.89 kg/m²    Remains afebrile.  No abd pain and feels ok  HIDA showed acute cholecystitis with biliary obstruction GI -EUS ERCP this afternoon. 1/19 w biopsy of the tail of the pancreas -frozen cytology concerning for cancer.  - med management      Summary of relevant labs:  Labs:  WBC 18.5 --> 11.7  Cr 0.79   -->572  AST 1139 --> 313  Bilirubin 2.92 --> 2.19  Lipase 1473 -->239    Micro:  Blood cx 1/18: no growth for 13 hours, final pending      Imaging:  HIDA 1/18: Acute cholecystitis with biliary obstruction  US gallbladder RUQ 1/18: cholelithiasis with significant gallbladder wall edema seen in the setting of pancreatitis, hepatitis or acute cholecystitis. Trace amount of fluid and Man's pouch. I have personally reviewed the past medical history, past surgical history, medications, social history, and family history, and I haveupdated the database accordingly.   Past Medical History:     Past Medical History:   Diagnosis Date    Essential hypertension     Hyperlipidemia     Pre-diabetes        Past Surgical  History:     Past Surgical History:   Procedure Laterality Date    APPENDECTOMY N/A     COLONOSCOPY N/A     PACEMAKER INSERTION N/A 12/30/2015    PACEMAKER PLACEMENT      [employer] pacemaker, NOT MRI SAFE per [employer]- krm    KIRSTEN AND BSO N/A        Medications:      morphine  2 mg IntraVENous Once    sodium chloride flush  5-40 mL IntraVENous 2 times per day    [Held by provider] enoxaparin  40 mg SubCUTAneous Daily    atorvastatin  40 mg Oral Nightly    [Held by provider] losartan  100 mg Oral Daily    pantoprazole  40 mg Oral QAM AC    midodrine  2.5 mg Oral TID WC    piperacillin-tazobactam  3,375 mg IntraVENous Q8H       Social History:     Social History     Socioeconomic History    Marital status:      Spouse name: Not on file    Number of children: Not on file    Years of education: Not on file    Highest education level: Not on file   Occupational History    Not on file   Tobacco Use    Smoking status: Never Smoker    Smokeless tobacco: Never Used   Substance and Sexual Activity    Alcohol use: Never    Drug use: Never    Sexual activity: Not on file   Other Topics Concern    Not on file   Social History Narrative    Not on file     Social Determinants of Health     Financial Resource Strain:     Difficulty of Paying Living Expenses: Not on file   Food Insecurity:     Worried About Running Out of Food in the Last Year: Not on file    Fred of Food in the Last Year: Not on file   Transportation Needs:     Lack of Transportation (Medical): Not on file    Lack of Transportation (Non-Medical): Not on file   Physical Activity:     Days of Exercise per Week: Not on file    Minutes of Exercise per Session: Not on file   Stress:     Feeling of Stress : Not on file   Social Connections:     Frequency of Communication with Friends and Family: Not on file    Frequency of Social Gatherings with Friends and Family: Not on file    Attends Yazidi Services: Not on file    Active Member of Clubs or Organizations: Not on file    Attends Club or Organization Meetings: Not on file    Marital Status: Not on file   Intimate Partner Violence:     Fear of Current or Ex-Partner: Not on file    Emotionally Abused: Not on file    Physically Abused: Not on file    Sexually Abused: Not on file   Housing Stability:     Unable to Pay for Housing in the Last Year: Not on file    Number of [place] in the Last Year: Not on file    Unstable Housing in the Last Year: Not on file       Family History:     Family History   Problem Relation Age of Onset    No Known Problems Mother     No Known Problems Father         Allergies:   Adhesive tape     Review of Systems:     Review of Systems   Constitutional: Negative for activity change, chills and fever. HENT: Negative for congestion and rhinorrhea. Eyes: Negative for redness and visual disturbance. Respiratory: Negative for cough and shortness of breath. Cardiovascular: Negative for chest pain and leg swelling. Gastrointestinal: Positive for nausea and vomiting. Negative for abdominal pain. Endocrine: Negative for heat intolerance and polyphagia. Genitourinary: Negative for dysuria and frequency. Musculoskeletal: Negative for arthralgias and myalgias. Skin: Negative for color change and rash. Allergic/Immunologic: Negative for immunocompromised state. Neurological: Negative for dizziness, light-headedness and headaches. Hematological: Negative for adenopathy. Psychiatric/Behavioral: Positive for confusion. Negative for agitation and sleep disturbance. The patient is not nervous/anxious. Physical Examination :     No data found. Physical Exam  Constitutional:       Appearance: She is normal weight. She is not ill-appearing. HENT:      Head: Normocephalic and atraumatic. Nose: Nose normal.      Mouth/Throat:      Mouth: Mucous membranes are moist.      Pharynx: Oropharynx is clear. Eyes:      General: No scleral icterus. Extraocular Movements: Extraocular movements intact. Conjunctiva/sclera: Conjunctivae normal.   Cardiovascular:      Rate and Rhythm: Normal rate and regular rhythm. Pulses: Normal pulses. Heart sounds: Normal heart sounds. Pulmonary:      Effort: Pulmonary effort is normal. No respiratory distress. Breath sounds: Normal breath sounds. Abdominal:      General: Abdomen is flat. Palpations: Abdomen is soft. Tenderness: There is no abdominal tenderness. There is no guarding. Genitourinary:     Comments: No proctor  No CVA tenderness  Musculoskeletal:         General: Normal range of motion. Cervical back: Normal range of motion and neck supple. Right lower leg: No edema. Left lower leg: No edema. Skin:     General: Skin is warm and dry. Capillary Refill: Capillary refill takes less than 2 seconds. Neurological:      Mental Status: She is alert.  Mental status is at baseline. She is disoriented. Cranial Nerves: No cranial nerve deficit. Sensory: No sensory deficit. Coordination: Coordination normal.   Psychiatric:         Behavior: Behavior normal.         Thought Content: Thought content normal.           Medical Decision Making:   I have independently reviewed/ordered the following labs:    CBC with Differential:   Recent Labs     01/18/22 0444 01/19/22 0453   WBC 18.5* 11.7*   HGB 12.5 11.1*   HCT 38.0 34.4*    145   LYMPHOPCT  --  16*   MONOPCT  --  6     BMP:  Recent Labs     01/18/22  0444 01/18/22  0444 01/18/22  1129 01/19/22 0453      < > 142 137   K 3.5*   < > 4.6 3.8      < > 109* 107   CO2 22   < > 23 22   BUN 17   < > 20 20   CREATININE 0.79   < > 0.75 0.73   MG 1.4*  --   --   --     < > = values in this interval not displayed. Hepatic Function Panel:   Recent Labs     01/18/22 0444 01/19/22 0453   PROT 6.0* 5.3*   LABALBU 3.3* 2.6*   BILITOT 2.92* 2.19*   ALKPHOS 185* 189*   * 572*   AST 1,139* 313*     No results for input(s): RPR in the last 72 hours. No results for input(s): HIV in the last 72 hours. No results for input(s): BC in the last 72 hours. Lab Results   Component Value Date    CREATININE 0.73 01/19/2022    GLUCOSE 104 01/19/2022     Thank you for allowing us to participate in the care of this patient. Please call with questions. This note is created with the assistance of a speech recognition program.  While intending to generate adocument that actually reflects the content of the visit, the document can still have some errors including those of syntax and sound a like substitutions which may escape proof reading. It such instances, actual meaningcan be extrapolated by contextual diversion. Veronika Maciel, OMS-IV         I have discussed the care of the patient, including pertinent history and exam findings,  with the resident.  I have seen and examined the patient and the key elements of all parts of the encounter have been performed by me. I agree with the assessment, plan and orders as documented by the resident.     Lana Garibay, Infectious Diseases

## 2022-01-19 NOTE — PLAN OF CARE
Sent a secure message to primary MD about how the procedure went and an update as to the preliminary findings . Toby Gunn PACU RN is attempting to have DR Mejia Filter)  update family.

## 2022-01-19 NOTE — ANESTHESIA PRE PROCEDURE
Department of Anesthesiology  Preprocedure Note       Name:  Heydi Bhakta   Age:  80 y.o.  :  1931                                          MRN:  [id number]         Date:  2022      Surgeon: Essence Santos):  Sharri Zaman MD    Procedure: Procedure(s):  ** ADD ON REQ. 1430. ** ENDOSCOPIC ULTRASOUND,ERCP ENDOSCOPIC RETROGRADE CHOLANGIOPANCREATOGRAPHY, C-ARM    Medications prior to admission:   Prior to Admission medications    Medication Sig Start Date End Date Taking?  Authorizing Provider   losartan (COZAAR) 100 MG tablet Take 100 mg by mouth daily   Yes Historical Provider, MD   pantoprazole (PROTONIX) 40 MG tablet Take 40 mg by mouth daily   Yes Historical Provider, MD   atorvastatin (LIPITOR) 40 MG tablet Take 40 mg by mouth daily    Historical Provider, MD       Current medications:    Current Facility-Administered Medications   Medication Dose Route Frequency Provider Last Rate Last Admin    lactated ringers infusion   IntraVENous Continuous Mariod LUCÍA Hernandez,  mL/hr at 22 1307 New Bag at 22 1307    morphine injection 2 mg  2 mg IntraVENous Once Lesia Lanes, DO        sodium chloride flush 0.9 % injection 5-40 mL  5-40 mL IntraVENous 2 times per day Ronita Cranker, APRN - CNP   10 mL at 22 0900    sodium chloride flush 0.9 % injection 5-40 mL  5-40 mL IntraVENous PRN Ronita Cranker, APRN - CNP        0.9 % sodium chloride infusion  25 mL IntraVENous PRN Ronita Cranker, APRN - CNP        potassium chloride 10 mEq/100 mL IVPB (Peripheral Line)  10 mEq IntraVENous PRN Ronita Cranker, APRN - CNP        magnesium sulfate 1000 mg in dextrose 5% 100 mL IVPB  1,000 mg IntraVENous PRN Ronita Cranker, APRN - CNP        acetaminophen (TYLENOL) tablet 650 mg  650 mg Oral Q4H PRN Ronita Cranker, APRN - CNP        HYDROcodone-acetaminophen (NORCO) 5-325 MG per tablet 1 tablet  1 tablet Oral Q4H PRN Ronita Cranker, APRN - CNP        Or    HYDROcodone-acetaminophen (NORCO) 5-325 MG per tablet 2 tablet  2 tablet Oral Q4H PRN Almon Arm, APRN - CNP        HYDROmorphone (DILAUDID) injection 0.25 mg  0.25 mg IntraVENous Q3H PRN Almon Arm, APRN - CNP        Or    HYDROmorphone (DILAUDID) injection 0.5 mg  0.5 mg IntraVENous Q3H PRN Almon Arm, APRN - CNP        ondansetron (ZOFRAN-ODT) disintegrating tablet 4 mg  4 mg Oral Q8H PRN Almon Arm, APRN - CNP        Or    ondansetron [facility]) injection 4 mg  4 mg IntraVENous Q6H PRN Almon Arm, APRN - CNP        [Held by provider] enoxaparin (LOVENOX) injection 40 mg  40 mg SubCUTAneous Daily Almon Arm, APRN - CNP        atorvastatin (LIPITOR) tablet 40 mg  40 mg Oral Nightly Maria M Kelvin, APRN - CNS   40 mg at 01/18/22 2031    [Held by provider] losartan (COZAAR) tablet 100 mg  100 mg Oral Daily Maria M Kelvin, APRN - CNS        pantoprazole (PROTONIX) tablet 40 mg  40 mg Oral QAM AC Maria M Kelvin, APRN - CNS        midodrine (PROAMATINE) tablet 2.5 mg  2.5 mg Oral TID  Mohammad I Mashaleh, DO   2.5 mg at 01/18/22 1728    piperacillin-tazobactam (ZOSYN) 3,375 mg in dextrose 5 % 50 mL IVPB extended infusion (mini-bag)  3,375 mg IntraVENous Q8H Mohammad I Mashaleh, DO 12.5 mL/hr at 01/19/22 0935 3,375 mg at 01/19/22 0935       Allergies:     Allergies   Allergen Reactions    Adhesive Tape        Problem List:    Patient Active Problem List   Diagnosis Code    Cholecystitis K81.9    Gallstone pancreatitis K85.10    Essential hypertension I10    Sepsis ([facility] [address] A41.9    Ascending cholangitis K83.09    Acute pancreatitis K85.90       Past Medical History:        Diagnosis Date    Essential hypertension     Hyperlipidemia     Pre-diabetes        Past Surgical History:        Procedure Laterality Date    APPENDECTOMY N/A     COLONOSCOPY N/A     PACEMAKER INSERTION N/A 12/30/2015    PACEMAKER PLACEMENT      [employer] pacemaker, NOT MRI SAFE per [employer]- krm    KIRSTEN AND BSO N/A        Social History:    Social History     Tobacco Use    Smoking status: Never Smoker    Smokeless tobacco: Never Used   Substance Use Topics    Alcohol use: Never                                Counseling given: Not Answered      Vital Signs (Current):   Vitals:    01/18/22 0757 01/18/22 0845 01/18/22 2015 01/19/22 0730   BP: (!) 105/49 (!) 91/41 (!) 111/57 103/62   Pulse: 65 65 65 67   Resp: 18 18 16   Temp:  98 °F (36.7 °C) 98.6 °F (37 °C) 99.1 °F (37.3 °C)   TempSrc:  Oral Oral Oral   SpO2: 95% 98% 100% 96%   Weight:       Height:                                                  BP Readings from Last 3 Encounters:   01/19/22 103/62       NPO Status:                                                                                 BMI:   Wt Readings from Last 3 Encounters:   01/17/22 145 lb (65.8 kg)     Body mass index is 24.89 kg/m². CBC:   Lab Results   Component Value Date    WBC 11.7 01/19/2022    RBC 3.65 01/19/2022    HGB 11.1 01/19/2022    HCT 34.4 01/19/2022    MCV 94.2 01/19/2022    RDW 16.4 01/19/2022     01/19/2022       CMP:   Lab Results   Component Value Date     01/19/2022    K 3.8 01/19/2022     01/19/2022    CO2 22 01/19/2022    BUN 20 01/19/2022    CREATININE 0.73 01/19/2022    GFRAA >60 01/19/2022    LABGLOM >60 01/19/2022    GLUCOSE 104 01/19/2022    PROT 5.3 01/19/2022    CALCIUM 8.1 01/19/2022    BILITOT 2.19 01/19/2022    ALKPHOS 189 01/19/2022     01/19/2022     01/19/2022       POC Tests: No results for input(s): POCGLU, POCNA, POCK, POCCL, POCBUN, POCHEMO, POCHCT in the last 72 hours.     Coags:   Lab Results   Component Value Date    PROTIME 12.3 01/19/2022    INR 1.2 01/19/2022       HCG (If Applicable): No results found for: PREGTESTUR, PREGSERUM, HCG, HCGQUANT     ABGs: No results found for: PHART, PO2ART, QDW0IQA, FUQ0QJS, BEART, U4SIMOBV Type & Screen (If Applicable):  No results found for: LABABO, LABRH    Drug/Infectious Status (If Applicable):  No results found for: HIV, HEPCAB    COVID-19 Screening (If Applicable):   Lab Results   Component Value Date    COVID19 Not Detected 01/18/2022           Anesthesia Evaluation    Airway: Mallampati: II     Neck ROM: full   Dental:      Comment: Poor dentition, denies loose teeth    Pulmonary:Negative Pulmonary ROS breath sounds clear to auscultation                             Cardiovascular:    (+) hypertension:,         Rhythm: regular  Rate: normal                    Neuro/Psych:   Negative Neuro/Psych ROS              GI/Hepatic/Renal:   (+) liver disease:,           Endo/Other: Negative Endo/Other ROS                    Abdominal:         (-) obese       Vascular: negative vascular ROS. Other Findings:           Anesthesia Plan      general     ASA 3       Induction: intravenous. Anesthetic plan and risks discussed with patient (grandchildren). Plan discussed with CRNA.                   Madeline Gary MD   1/19/2022

## 2022-01-19 NOTE — PLAN OF CARE
Updated son, [address] [place] [address] . He will call again in am for updates. Pt remains drowsy, offered her tray, she does not want at this time. Left in room for her.

## 2022-01-20 ENCOUNTER — APPOINTMENT (OUTPATIENT)
Dept: ULTRASOUND IMAGING | Age: 87
DRG: 871 | End: 2022-01-20
Payer: MEDICARE

## 2022-01-20 LAB
ALBUMIN SERPL-MCNC: 2.5 G/DL (ref 3.5–5.2)
ALBUMIN SERPL-MCNC: 2.5 G/DL (ref 3.5–5.2)
ALBUMIN/GLOBULIN RATIO: 0.8 (ref 1–2.5)
ALBUMIN/GLOBULIN RATIO: 0.9 (ref 1–2.5)
ALP BLD-CCNC: 148 U/L (ref 35–104)
ALP BLD-CCNC: 201 U/L (ref 35–104)
ALT SERPL-CCNC: 268 U/L (ref 5–33)
ALT SERPL-CCNC: 403 U/L (ref 5–33)
ANION GAP SERPL CALCULATED.3IONS-SCNC: 13 MMOL/L (ref 9–17)
ANION GAP SERPL CALCULATED.3IONS-SCNC: 9 MMOL/L (ref 9–17)
AST SERPL-CCNC: 146 U/L
AST SERPL-CCNC: 74 U/L
BILIRUB SERPL-MCNC: 0.66 MG/DL (ref 0.3–1.2)
BILIRUB SERPL-MCNC: 1.08 MG/DL (ref 0.3–1.2)
BUN BLDV-MCNC: 26 MG/DL (ref 8–23)
BUN BLDV-MCNC: 33 MG/DL (ref 8–23)
BUN/CREAT BLD: ABNORMAL (ref 9–20)
BUN/CREAT BLD: ABNORMAL (ref 9–20)
CALCIUM SERPL-MCNC: 8 MG/DL (ref 8.6–10.4)
CALCIUM SERPL-MCNC: 8.5 MG/DL (ref 8.6–10.4)
CASE NUMBER:: NORMAL
CHLORIDE BLD-SCNC: 106 MMOL/L (ref 98–107)
CHLORIDE BLD-SCNC: 107 MMOL/L (ref 98–107)
CO2: 17 MMOL/L (ref 20–31)
CO2: 20 MMOL/L (ref 20–31)
CREAT SERPL-MCNC: 0.76 MG/DL (ref 0.5–0.9)
CREAT SERPL-MCNC: 0.77 MG/DL (ref 0.5–0.9)
GFR AFRICAN AMERICAN: >60 ML/MIN
GFR AFRICAN AMERICAN: >60 ML/MIN
GFR NON-AFRICAN AMERICAN: >60 ML/MIN
GFR NON-AFRICAN AMERICAN: >60 ML/MIN
GFR SERPL CREATININE-BSD FRML MDRD: ABNORMAL ML/MIN/{1.73_M2}
GLUCOSE BLD-MCNC: 109 MG/DL (ref 70–99)
GLUCOSE BLD-MCNC: 152 MG/DL (ref 70–99)
POTASSIUM SERPL-SCNC: 4.5 MMOL/L (ref 3.7–5.3)
POTASSIUM SERPL-SCNC: 4.7 MMOL/L (ref 3.7–5.3)
SODIUM BLD-SCNC: 136 MMOL/L (ref 135–144)
SODIUM BLD-SCNC: 136 MMOL/L (ref 135–144)
SPECIMEN DESCRIPTION: NORMAL
TOTAL PROTEIN: 5.2 G/DL (ref 6.4–8.3)
TOTAL PROTEIN: 5.8 G/DL (ref 6.4–8.3)

## 2022-01-20 PROCEDURE — 2580000003 HC RX 258: Performed by: INTERNAL MEDICINE

## 2022-01-20 PROCEDURE — 97162 PT EVAL MOD COMPLEX 30 MIN: CPT

## 2022-01-20 PROCEDURE — 86301 IMMUNOASSAY TUMOR CA 19-9: CPT

## 2022-01-20 PROCEDURE — 76937 US GUIDE VASCULAR ACCESS: CPT

## 2022-01-20 PROCEDURE — 6370000000 HC RX 637 (ALT 250 FOR IP): Performed by: INTERNAL MEDICINE

## 2022-01-20 PROCEDURE — 99232 SBSQ HOSP IP/OBS MODERATE 35: CPT | Performed by: INTERNAL MEDICINE

## 2022-01-20 PROCEDURE — 80053 COMPREHEN METABOLIC PANEL: CPT

## 2022-01-20 PROCEDURE — 97166 OT EVAL MOD COMPLEX 45 MIN: CPT

## 2022-01-20 PROCEDURE — 36415 COLL VENOUS BLD VENIPUNCTURE: CPT

## 2022-01-20 PROCEDURE — 6360000002 HC RX W HCPCS: Performed by: INTERNAL MEDICINE

## 2022-01-20 PROCEDURE — 76975 GI ENDOSCOPIC ULTRASOUND: CPT

## 2022-01-20 PROCEDURE — 99223 1ST HOSP IP/OBS HIGH 75: CPT | Performed by: INTERNAL MEDICINE

## 2022-01-20 PROCEDURE — 97535 SELF CARE MNGMENT TRAINING: CPT

## 2022-01-20 PROCEDURE — 99233 SBSQ HOSP IP/OBS HIGH 50: CPT | Performed by: INTERNAL MEDICINE

## 2022-01-20 PROCEDURE — 97530 THERAPEUTIC ACTIVITIES: CPT

## 2022-01-20 PROCEDURE — 1200000000 HC SEMI PRIVATE

## 2022-01-20 RX ADMIN — SODIUM CHLORIDE, PRESERVATIVE FREE 10 ML: 5 INJECTION INTRAVENOUS at 09:41

## 2022-01-20 RX ADMIN — PIPERACILLIN AND TAZOBACTAM 3375 MG: 3; .375 INJECTION, POWDER, FOR SOLUTION INTRAVENOUS at 09:42

## 2022-01-20 RX ADMIN — PANTOPRAZOLE SODIUM 40 MG: 40 TABLET, DELAYED RELEASE ORAL at 09:41

## 2022-01-20 RX ADMIN — PIPERACILLIN AND TAZOBACTAM 3375 MG: 3; .375 INJECTION, POWDER, FOR SOLUTION INTRAVENOUS at 00:16

## 2022-01-20 RX ADMIN — MIDODRINE HYDROCHLORIDE 2.5 MG: 2.5 TABLET ORAL at 09:41

## 2022-01-20 RX ADMIN — PIPERACILLIN AND TAZOBACTAM 3375 MG: 3; .375 INJECTION, POWDER, FOR SOLUTION INTRAVENOUS at 17:58

## 2022-01-20 ASSESSMENT — ENCOUNTER SYMPTOMS
SHORTNESS OF BREATH: 0
VOMITING: 1
RHINORRHEA: 0
ABDOMINAL PAIN: 0
EYE DISCHARGE: 0
NAUSEA: 1
EYE REDNESS: 0
COUGH: 0
EYE ITCHING: 0
COLOR CHANGE: 0

## 2022-01-20 ASSESSMENT — PAIN DESCRIPTION - FREQUENCY: FREQUENCY: CONTINUOUS

## 2022-01-20 ASSESSMENT — PAIN DESCRIPTION - DESCRIPTORS: DESCRIPTORS: ACHING;SORE;DISCOMFORT

## 2022-01-20 ASSESSMENT — PAIN DESCRIPTION - PAIN TYPE: TYPE: ACUTE PAIN

## 2022-01-20 ASSESSMENT — PAIN DESCRIPTION - LOCATION
LOCATION: ABDOMEN
LOCATION: ABDOMEN

## 2022-01-20 ASSESSMENT — PAIN DESCRIPTION - ORIENTATION
ORIENTATION: MID
ORIENTATION: MID

## 2022-01-20 ASSESSMENT — PAIN SCALES - GENERAL
PAINLEVEL_OUTOF10: 2
PAINLEVEL_OUTOF10: 2

## 2022-01-20 NOTE — PROGRESS NOTES
shifts: In: 1151.7 [I.V.:1101.7; IV Piggyback:50]  Out: 350 [Urine:350]    Drain/tube output: In: 1151.7 [I.V.:1101.7]  Out: 350 [Urine:350]    LAB:  CBC:   Recent Labs     01/18/22  0444 01/19/22  0453   WBC 18.5* 11.7*   HGB 12.5 11.1*   HCT 38.0 34.4*   MCV 90.7 94.2    145     BMP:   Recent Labs     01/18/22  1129 01/19/22  0453 01/20/22  0419    137 136   K 4.6 3.8 4.7   * 107 106   CO2 23 22 17*   BUN 20 20 26*   CREATININE 0.75 0.73 0.77   GLUCOSE 121* 104* 109*     COAGS:   Recent Labs     01/18/22  0444 01/19/22  0453 01/20/22  0419   PROT 6.0* 5.3* 5.8*   INR 1.1 1.2  --        RADIOLOGY:  US GI ENDOSCOPIC S&I   Final Result      NM HEPATOBILIARY   Final Result   No excretion of contrast into the extrahepatic biliary tree or small bowel at   2 hours. Gallbladder is small bowel or visualized at 18 hours, delayed   visualization compatible with chronic cholecystitis. RECOMMENDATIONS:   Unavailable         US GALLBLADDER RUQ   Final Result   1. Cholelithiases with significant gallbladder wall edema can be seen in the   setting of pancreatitis, hepatitis or acute cholecystitis. 2. Trace amount of fluid in Man's pouch. Jhony Jimenez MD  1/20/22, 10:34 AM        Attending Note      I have reviewed the above TECSS note(s) and I either performed the key elements of the medical history and physical exam or was present with the resident when the key elements of the medical history and physical exam were performed. I have discussed the findings, established the care plan and recommendations with Resident, TECSS RN, bedside nurse.     Sara Salas MD  1/20/2022  7:46 PM

## 2022-01-20 NOTE — CONSULTS
Today's Date: 1/20/2022  Patient Name: Behzad Verma  Date of admission: 1/17/2022 11:22 PM  Patient's age: 80 y. o., 12/1/1931  Admission Dx: Cholecystitis [K81.9]  Acute pancreatitis, unspecified complication status, unspecified pancreatitis type [K85.90]    Reason for Consult: 10 mm by 12 mm mass in pancreatic tail. Requesting Physician: Helga Dennison DO    Chief Complaint:  Abdominal Pain. History Obtained From:  patient, electronic medical record    History of Present Illness: The patient is a 80 y.o. female who is admitted to the hospital for ascending cholangitis. Per patient she experienced some abdominal pain, nausea, vomiting and fatigue and was taken to [facility] via family for concerns of acute cholecystitis vs pancreatitis. AST was 726, , Bilirubin elevated 2.2 with indirect bilirubin 1.2, lipase 21585, WBC 11.5. CT abdomen and pelvis with contrast demonstrated dilation and wall thickening of the gallbladder and dilated common bile duct with fat stranding around the pancreas. She was transferred to [address] [place] [address] for ERCP and GS eval. Underwent EUS 1/19/2022 which demonstrated a 10 mm by 12 mm mass in the tail of the pancreas. Oncology was consulted. Spoke with patient at bedside. She does have baseline dementia so history difficult to obtain. However at this time denies any symptoms. Denies family history of cancer. Past Medical History:   has a past medical history of Essential hypertension, Hyperlipidemia, and Pre-diabetes. Past Surgical History:   has a past surgical history that includes Appendectomy (N/A); katia and bso (cervix removed) (N/A); Colonoscopy (N/A); Pacemaker insertion (N/A, 12/30/2015); pacemaker placement; Upper gastrointestinal endoscopy (01/19/2022); Upper gastrointestinal endoscopy (N/A, 1/19/2022); and Upper gastrointestinal endoscopy (N/A, 1/19/2022).      Medications:    Prior to Admission medications    Medication Sig Start Date End Date Taking?  Authorizing Provider   losartan (COZAAR) 100 MG tablet Take 100 mg by mouth daily   Yes Historical Provider, MD   pantoprazole (PROTONIX) 40 MG tablet Take 40 mg by mouth daily   Yes Historical Provider, MD   atorvastatin (LIPITOR) 40 MG tablet Take 40 mg by mouth daily    Historical Provider, MD     Current Facility-Administered Medications   Medication Dose Route Frequency Provider Last Rate Last Admin    morphine injection 2 mg  2 mg IntraVENous Once Joslyn Reyes MD        sodium chloride flush 0.9 % injection 5-40 mL  5-40 mL IntraVENous 2 times per day Joslyn Reyes MD   10 mL at 01/20/22 0941    sodium chloride flush 0.9 % injection 5-40 mL  5-40 mL IntraVENous PRN Joslyn Reyes MD        0.9 % sodium chloride infusion  25 mL IntraVENous PRN Joslyn Reyes MD        potassium chloride 10 mEq/100 mL IVPB (Peripheral Line)  10 mEq IntraVENous PRN Joslyn Reyes MD        magnesium sulfate 1000 mg in dextrose 5% 100 mL IVPB  1,000 mg IntraVENous PRN Joslyn Reyes MD        acetaminophen (TYLENOL) tablet 650 mg  650 mg Oral Q4H PRN Joslyn Reyes MD        HYDROcodone-acetaminophen (NORCO) 5-325 MG per tablet 1 tablet  1 tablet Oral Q4H PRN Joslyn Reyes MD        Or    HYDROcodone-acetaminophen (NORCO) 5-325 MG per tablet 2 tablet  2 tablet Oral Q4H PRN Joslyn Reyes MD        HYDROmorphone (DILAUDID) injection 0.25 mg  0.25 mg IntraVENous Q3H PRN Joslyn Reyes MD        Or    HYDROmorphone (DILAUDID) injection 0.5 mg  0.5 mg IntraVENous Q3H PRN Joslyn Reyes MD        ondansetron (ZOFRAN-ODT) disintegrating tablet 4 mg  4 mg Oral Q8H PRN Joslyn Reyes MD        Or    ondansetron (ZOFRAN) injection 4 mg  4 mg IntraVENous Q6H PRN Joslyn Reyes MD        [Held by provider] enoxaparin (LOVENOX) injection 40 mg  40 mg SubCUTAneous Daily Joslyn Reyes MD        atorvastatin (LIPITOR) tablet 40 mg  40 mg Oral Nightly Savannah Lambert, MD   40 mg at 01/18/22 2031    [Held by provider] losartan (COZAAR) tablet 100 mg  100 mg Oral Daily Sultana Kern MD        pantoprazole (PROTONIX) tablet 40 mg  40 mg Oral QAM AC Sultana Kern MD   40 mg at 01/20/22 0941    midodrine (PROAMATINE) tablet 2.5 mg  2.5 mg Oral TID WC Sultana Kern MD   2.5 mg at 01/20/22 0941    piperacillin-tazobactam (ZOSYN) 3,375 mg in dextrose 5 % 50 mL IVPB extended infusion (mini-bag)  3,375 mg IntraVENous Eva Nguyen MD 12.5 mL/hr at 01/20/22 0942 3,375 mg at 01/20/22 4323       Allergies:  Adhesive tape    Social History:   reports that she has never smoked. She has never used smokeless tobacco. She reports that she does not drink alcohol and does not use drugs. Family History: family history includes No Known Problems in her father and mother. Review of Symptoms:  Difficult to assess as patient has dementia. Denies any issues. Constitutional: Fatigue. No fever or chills. No night sweats, no weight loss   Eyes: No eye discharge, double vision, or eye pain   HEENT: negative for sore mouth, sore throat, hoarseness and voice change   Respiratory: negative for cough , sputum, dyspnea, wheezing, hemoptysis, chest pain   Cardiovascular: negative for chest pain, dyspnea, palpitations, orthopnea, PND   Gastrointestinal: negative for nausea, vomiting, diarrhea, constipation, abdominal pain, Dysphagia, hematemesis and hematochezia   Genitourinary: negative for frequency, dysuria, nocturia, urinary incontinence, and hematuria   Integument: negative for rash, skin lesions, bruises.    Hematologic/Lymphatic: negative for easy bruising, bleeding, lymphadenopathy, or petechiae   Endocrine: negative for heat or cold intolerance,weight changes, change in bowel habits and hair loss   Musculoskeletal: negative for myalgias, arthralgias, pain, joint swelling,and bone pain   Neurological: negative for headaches, dizziness, seizures, weakness, numbness    PHYSICAL EXAM:      BP (!) 151/84   Pulse 68   Temp 97.7 °F (36.5 °C) (Oral)   Resp 18   Ht 5' 4\" (1.626 m)   Wt 145 lb (65.8 kg)   SpO2 98%   BMI 24.89 kg/m²    Temp (24hrs), Av.6 °F (36.4 °C), Min:86.2 °F (30.1 °C), Max:98.2 °F (36.8 °C)      General appearance - well appearing, no in pain or distress   Mental status - alert and cooperative   Eyes - pupils equal and reactive, extraocular eye movements intact   Ears - bilateral TM's and external ear canals normal   Mouth - mucous membranes moist, pharynx normal without lesions   Neck - supple, no significant adenopathy   Lymphatics - no palpable lymphadenopathy, no hepatosplenomegaly   Chest - clear to auscultation, no wheezes, rales or rhonchi, symmetric air entry   Heart - normal rate, regular rhythm, normal S1, S2, no murmurs  Abdomen - soft, nontender, nondistended, no masses or organomegaly   Neurological - alert, oriented, normal speech, pleasantly confused.     Musculoskeletal - no joint tenderness, deformity or swelling   Extremities - peripheral pulses normal, no pedal edema, no clubbing or cyanosis   Skin - normal coloration and turgor, no rashes, no suspicious skin lesions noted ,    Labs:   Complete Blood Count:   Recent Labs     22  0444 22  0453   WBC 18.5* 11.7*   HGB 12.5 11.1*   MCV 90.7 94.2    145   RBC 4.19 3.65*   HCT 38.0 34.4*   MCH 29.8 30.4   MCHC 32.9 32.3   RDW 15.7* 16.4*   MPV 11.1 11.6      PT/INR:    Lab Results   Component Value Date    PROTIME 12.3 2022    INR 1.2 2022     PTT:  No results found for: APTT, PTT    Basal Metabolic Profile:   Recent Labs     22  1129 22  0453 22  0419    137 136   K 4.6 3.8 4.7   BUN 20 20 26*   CREATININE 0.75 0.73 0.77   * 107 106   CO2 23 22 17*      LFTs  Recent Labs     22  0444 22  0453 22  0419   ALKPHOS 185* 189* 201*   * 572* 403*   AST 1,139* 313* 146*   BILITOT 2.92* 2.19* 1.08   LABALBU 3.3* 2.6* 2.5* Imaging:  NM HEPATOBILIARY    Result Date: 1/19/2022  No excretion of contrast into the extrahepatic biliary tree or small bowel at 2 hours. Gallbladder is small bowel or visualized at 18 hours, delayed visualization compatible with chronic cholecystitis. RECOMMENDATIONS: Unavailable     US GALLBLADDER RUQ    Result Date: 1/18/2022  1. Cholelithiases with significant gallbladder wall edema can be seen in the setting of pancreatitis, hepatitis or acute cholecystitis. 2. Trace amount of fluid in Man's pouch. Impression:   Primary Problem  Ascending cholangitis  Active Hospital Problems    Diagnosis Date Noted    Cholecystitis [K81.9] 01/18/2022    Gallstone pancreatitis [K85.10] 01/18/2022    Essential hypertension [I10] 01/18/2022    Sepsis ([address] [A41.9] 01/18/2022    Ascending cholangitis [K83.09] 01/18/2022    Acute pancreatitis [K85.90]      Assessment and Plan:  1. 10 mm by 12 mm mass in the tail of the Pancreas on EUS concerning for malignancy. 2. Ascending Cholangitis. 3. Acute Pancreatitis. I personally reviewed results of lab work-up imaging studies and other relevant clinical data. Reviewed previous medical records  Reviewed images of CT scan  Reviewed recommendation from other teams  Further treatment recommendations will depend upon path report and diagnosis. Patient counseled tobacco cessation  Will continue to follow     Discussed with patient and Nurse. Thank you for asking us to see this patient. Irvin Clark DO  PGY-3, Internal medicine resident  [place], [place]  1/20/2022 12:15 PM    Attending Physician Statement  The patient was seen and examined during rounds, I have discussed the care of Nellie Navarro, including pertinent history and exam findings with the resident. I have reviewed the key elements of all parts of the encounter with the resident. I agree with the assessment, and status of the problem list as documented.

## 2022-01-20 NOTE — FLOWSHEET NOTE
Marked memory loss noted on assessment. Pt is aware of self and that she is in \"the hospital\". However, she must be reoriented to date and situaution.

## 2022-01-20 NOTE — PROGRESS NOTES
Physical Therapy    Facility/Department: [address] MED SURG  Initial Assessment    NAME: Moreno Vera  : 1931  MRN: [id number]    Date of Service: 2022  Chief Complaint   Patient presents with    Abdominal Pain     Discharge Recommendations:  Patient would benefit from continued therapy after discharge      Assessment   Body structures, Functions, Activity limitations: Decreased functional mobility ; Decreased strength;Decreased endurance  Assessment: The pt ambulated 25 ft with a RW x CGA. She fatigued quickly with mobilization but reported no increase in pain. She could benefit from a continuation of PT for gait and strengthening following her DC  Prognosis: Good  Decision Making: Medium Complexity  PT Education: Goals;PT Role;Plan of Care  REQUIRES PT FOLLOW UP: Yes  Activity Tolerance  Activity Tolerance: Patient limited by fatigue;Patient limited by endurance       Patient Diagnosis(es): The encounter diagnosis was Acute pancreatitis, unspecified complication status, unspecified pancreatitis type. has a past medical history of Essential hypertension, Hyperlipidemia, and Pre-diabetes. has a past surgical history that includes Appendectomy (N/A); katia and bso (cervix removed) (N/A); Colonoscopy (N/A); Pacemaker insertion (N/A, 2015); pacemaker placement; Upper gastrointestinal endoscopy (2022); Upper gastrointestinal endoscopy (N/A, 2022); and Upper gastrointestinal endoscopy (N/A, 2022).     Restrictions  Restrictions/Precautions  Restrictions/Precautions: General Precautions,Fall Risk  Required Braces or Orthoses?: No  Position Activity Restriction  Other position/activity restrictions: up with assist  Vision/Hearing  Vision: Impaired  Vision Exceptions: Wears glasses for reading  Hearing: Within functional limits     Subjective  General  Patient assessed for rehabilitation services?: Yes  Response To Previous Treatment: Not applicable  Family / Caregiver Present: No  Follows Commands: Within Functional Limits  Subjective  Subjective: RN and pt agreeabloe to PT eval  Pain Screening  Patient Currently in Pain: Yes  Pain Assessment  Pain Assessment: 0-10  Pain Level: 2  Pain Location: Abdomen  Pain Orientation: Mid  Vital Signs  Patient Currently in Pain: Yes     Orientation  Orientation  Overall Orientation Status: Impaired  Orientation Level: Oriented to person;Disoriented to place;Oriented to situation;Disoriented to time  Social/Functional History  Social/Functional History  Lives With: Son,Spouse (2 adult sons)  Type of Home: House  Home Layout: Two level,Performs ADL's on one level,Able to Live on Main level with bedroom/bathroom (Bed for pt placed in living room)  Bathroom Shower/Tub: Walk-in shower  Bathroom Toilet: Standard  Bathroom Equipment: Built-in shower seat,Grab bars in shower  Bathroom Accessibility: Accessible  Home Equipment: Rolling walker,Cane,Wheelchair-manual (uses all PRN at home)  Receives Help From: Family  ADL Assistance: Needs assistance (Pt states only requiring assistance for LB ADL's from sons/granddtrs)  Homemaking Assistance: Needs assistance (Pt does cook/clean some, granddtrs and sons also perform these tasks, \"I show the granddaughters how to cook/clean properly\" per pt)  Homemaking Responsibilities: Yes  Ambulation Assistance: Independent (using cane/RW, sometimes uses w/c for func mob depending on the day, pt vague on these details)  Transfer Assistance: Independent  Active : No  Patient's  Info:  Son/dtr  Mode of Transportation: Family  Occupation: Retired  Leisure & Hobbies: romance novels  Additional Comments: Spouse has many health concerns, 24 hour supervision available from sons, dtrs live close by as well  Cognition      Objective     AROM RLE (degrees)  RLE AROM: WFL  AROM LLE (degrees)  LLE AROM : WFL  AROM RUE (degrees)  RUE AROM : WFL  AROM LUE (degrees)  LUE AROM : WFL  Strength RLE  [facility]  Strength LLE  Strength LLE: WFL  Strength RUE  Strength RUE: WFL  Strength LUE  Strength LUE: WFL     Sensation  Overall Sensation Status: WFL  Bed mobility  Supine to Sit: Minimal assistance  Sit to Supine: Minimal assistance  Scooting: Minimal assistance  Transfers  Sit to Stand: Minimal Assistance  Stand to sit: Minimal Assistance  Ambulation  Ambulation?: Yes  Ambulation 1  Surface: level tile  Device: Rolling Walker  Assistance: Contact guard assistance  Distance: amb 25 ft with a RW x CGA     Balance  Posture: Good  Sitting - Static: Good  Sitting - Dynamic: Fair  Standing - Static: Fair  Standing - Dynamic: [address]  Times per week: 5-6x wk  Current Treatment Recommendations: Strengthening,Functional Mobility Training,Transfer Training,Gait Training,Safety Education & Training,Balance Training,Endurance Training,Stair training  Safety Devices  Type of devices: Nurse notified,Left in bed,Call light within reach    G-Code     OutComes Score     AM-PAC Score  AM-PAC Inpatient Mobility Raw Score : 17 (01/20/22 1201)  AM-PAC Inpatient T-Scale Score : 42.13 (01/20/22 1201)  Mobility Inpatient CMS 0-100% Score: 50.57 (01/20/22 1201)  Mobility Inpatient CMS G-Code Modifier : CK (01/20/22 1201)        Goals  Short term goals  Time Frame for Short term goals: 10 visits  Short term goal 1: transfers with SBA  Short term goal 2: amb 125 ft with a RW x SBA  Short term goal 3: ascend/descend 4 steps with SBA  Short term goal 4: 20 min exercise program x SBA  Patient Goals   Patient goals : Return home       Therapy Time   Individual Concurrent Group Co-treatment   Time In 0840         Time Out 0912         Minutes 32             `1 of [facility], PT

## 2022-01-20 NOTE — DISCHARGE INSTR - COC
Continuity of Care Form    Patient Name: Nura Naidu   :  1931  MRN:  [id number]    [facility] date:  2022  Discharge date:  ***    Code Status Order: Full Code   Advance Directives:      Admitting Physician:  Jasper Coello DO  PCP: Bruce Centeno DO    Discharging Nurse: [facility] Unit/Room#: 8211/5795-31  Discharging Unit Phone Number: ***    Emergency Contact:   Extended Emergency Contact Information  Primary Emergency Contact: Crys Moran [address], [address] Phone: [phone]  Relation: Child  Secondary Emergency Contact: Roxie Luu, [address] Phone: [phone]  Relation: Child    Past Surgical History:  Past Surgical History:   Procedure Laterality Date    APPENDECTOMY N/A     COLONOSCOPY N/A     PACEMAKER INSERTION N/A 2015    PACEMAKER PLACEMENT      [employer] pacemaker, NOT MRI SAFE per [employer]- krm    KIRSTEN AND BSO N/A     UPPER GASTROINTESTINAL ENDOSCOPY  2022    w/EUS FNA    UPPER GASTROINTESTINAL ENDOSCOPY N/A 2022    EGD DIAGNOSTIC ONLY performed by Grady Gerber MD at [facility] 2022    EGD W/EUS FNA performed by Grady Gerber MD at [place] [address]       Immunization History: There is no immunization history on file for this patient.     Active Problems:  Patient Active Problem List   Diagnosis Code    Cholecystitis K81.9    Gallstone pancreatitis K85.10    Essential hypertension I10    Sepsis ([facility] A41.9    Ascending cholangitis K83.09    Acute pancreatitis K85.90       Isolation/Infection:   Isolation            No Isolation          Patient Infection Status       None to display            Nurse Assessment:  Last Vital Signs: BP (!) 151/84   Pulse 68   Temp 97.7 °F (36.5 °C) (Oral)   Resp 18   Ht 5' 4\" (1.626 m)   Wt 145 lb (65.8 kg)   SpO2 98%   BMI 24.89 kg/m²     Last documented pain score (0-10 scale): Pain Level: 2  Last Weight:   Wt Readings from Last 1 Encounters:   22 145 lb (65.8 kg)     Mental Status:  disoriented, alert, and able to concentrate and follow conversation    IV Access:  - midline in left basilic vein    Nursing Mobility/ADLs:  Walking   Assisted  Transfer  Assisted  Bathing  Assisted  Dressing  Assisted  [address] Delivery   whole    Wound Care Documentation and Therapy:        Elimination:  Continence: Bowel: Yes  Bladder: Yes  Urinary Catheter: None   Colostomy/Ileostomy/Ileal Conduit: No       Date of Last BM: 1/21/22    Intake/Output Summary (Last 24 hours) at 1/20/2022 1244  Last data filed at 1/19/2022 1747  Gross per 24 hour   Intake 1141.67 ml   Output 350 ml   Net 791.67 ml     I/O last 3 completed shifts: In: 1151.7 [I.V.:1101.7; IV Piggyback:50]  Out: 350 [Urine:350]    Safety Concerns:     None    Impairments/Disabilities:      None    Nutrition Therapy:  Current Nutrition Therapy:   - Oral Diet:  Low Fat    Routes of Feeding: Oral  Liquids: Thin Liquids  Daily Fluid Restriction: no  Last Modified Barium Swallow with Video (Video Swallowing Test): not done    Treatments at the Time of Hospital Discharge:   Respiratory Treatments: n/a  Oxygen Therapy:  is not on home oxygen therapy.   Ventilator:    - No ventilator support    Rehab Therapies: Physical Therapy and Occupational Therapy  Weight Bearing Status/Restrictions: No weight bearing restirctions  Other Medical Equipment (for information only, NOT a DME order):  walker  Other Treatments: IV antibiotics, Skilled Nursing Assessment    Patient's personal belongings (please select all that are sent with patient):  None    RN SIGNATURE:  Electronically signed by Matthew Siu RN on 1/21/22 at 12:02 PM EST    CASE MANAGEMENT/SOCIAL WORK SECTION    Inpatient Status Date: ***    Readmission Risk Assessment Score:  Readmission Risk              Risk of Unplanned Readmission:  15           Discharging to Facility/ Agency   Name: Address:  Phone:  [facility] Details  FAX            [address] [place], [place] [address]       Phone: [phone]       Fax: [phone]      [facility] [place] specialty infusion    Services Available   Infusion and IV Therapy        Address   [address] [facility] [address] [place] [address]               Dialysis Facility (if applicable)   Name:  Address:  Dialysis Schedule:  Phone:  Fax:    / signature: Electronically signed by Coleen Pierson RN on 1/20/22 at 12:44 PM EST    PHYSICIAN SECTION    Prognosis: Guarded    Condition at Discharge: Stable    Rehab Potential (if transferring to Rehab): Fair    Recommended Labs or Other Treatments After Discharge:   -Follow-up with your primary care physician within 1 week of discharge posthospitalization follow-up  -Continue IV Zosyn as prescribed. Follow-up with infectious disease Dr. Daniella Celaya after treatment complete for reevaluation. Please call and make an apointment  -Follow up with Oncology  for evaluation of suspected pancreatic cancer  -PT/OT  -Follow up with general surgery at [facility] clinic outpt if abdominal pain worsens for evaluation for possible cholecystectomy  -PT/OT as able    Physician Certification: I certify the above information and transfer of Stepan Goodwin  is necessary for the continuing treatment of the diagnosis listed and that she requires Home Care for greater 30 days.      Update Admission H&P: No change in H&P    PHYSICIAN SIGNATURE:  Electronically signed by Barbi Trevino DO on 1/21/22 at 2:05 PM EST

## 2022-01-20 NOTE — PROGRESS NOTES
[facility]  Office: [address], DO, Marycarmen West, DO, Ellie Shoemaker, DO, Cece Santoyo, DO, Brain Dancer, MD, Negro Melendez MD, Georgia Sen MD, Enrike Cameron MD, Brayan Riley MD, Burgess Justin MD, Antonia Upton MD, Maryjane Terry, DO, Linden Lloyd DO, Michelle Mora MD,  Gage Marcus DO, Moni Celeste MD, Bebeto Lugo MD, Adal Moore MD, June Blue MD, Luis Faye MD, Zoltan Santos MD, Branden Ovieod MD, Buzz Crawford, [facility], [facility], [facility], Ernstshannon Dunne, CNP, Darrell Smith, CNS, Amy Arvizu, CNP, Mark Bustillo, CNP, Salvador Gomez, CNP, Arlene Hdz, CNP, Author JORGE LUIS Becker, Edna Larsen PA-C, Belle Griffin, [facility], Will Trinidad [facility], Kristine Villa, CNP, Emilee Henderson, CNP, Truong Murphy, CNP, Courtney Haney, CNP, Howard Alas, CNP, [place], [address] [place] [address]    Progress Note    1/20/2022    11:45 AM    Name:   Sulma Ansari  MRN:     [id number]     Acct:      [de-identified]   Room:   [address] [place] [address] Day:  2  Admit Date:  1/17/2022 11:22 PM    PCP:   Juve Zaragoza DO  Code Status:  Full Code    Subjective:     C/C:   Chief Complaint   Patient presents with    Abdominal Pain     Interval History Status: improved. Patient does look more comfortable today. No acute events overnight. Denies any complaints currently. She is tolerating her diet. Brief History: This is a 5year-old female who initially presented to hospital with right upper quadrant abdominal pain. AST 1139, , alk phos 185, bilirubin 2.92. Ultrasound of the gallbladder showed cholelithiasis with significant gallbladder wall edema. HIDA scan showed findings consistent with cholecystitis. General surgery was consulted, plan for cholecystectomy prior to discharge.   Otherwise, patient underwent EUS on 1/19/2022 which showed: a 10 mm by 12 mm mass in pancreatic tail, preliminary cytology suspicious for malignancy. No stone was seen but CBD did show gall bladder wall thickening with sludge. Oncology was consulted. Review of Systems:     Constitutional:  negative for chills, fevers, sweats  Respiratory:  negative for cough, dyspnea on exertion, shortness of breath, wheezing  Cardiovascular:  negative for chest pain, chest pressure/discomfort, lower extremity edema, palpitations  Gastrointestinal:  negative for abdominal pain, constipation, diarrhea, nausea, vomiting  Neurological:  negative for dizziness, headache    Medications: Allergies: Allergies   Allergen Reactions    Adhesive Tape        Current Meds:   Scheduled Meds:    morphine  2 mg IntraVENous Once    sodium chloride flush  5-40 mL IntraVENous 2 times per day    [Held by provider] enoxaparin  40 mg SubCUTAneous Daily    atorvastatin  40 mg Oral Nightly    [Held by provider] losartan  100 mg Oral Daily    pantoprazole  40 mg Oral QAM AC    midodrine  2.5 mg Oral TID WC    piperacillin-tazobactam  3,375 mg IntraVENous Q8H     Continuous Infusions:    sodium chloride       PRN Meds: sodium chloride flush, sodium chloride, potassium chloride, magnesium sulfate, acetaminophen, HYDROcodone 5 mg - acetaminophen **OR** HYDROcodone 5 mg - acetaminophen, HYDROmorphone **OR** HYDROmorphone, ondansetron **OR** ondansetron    Data:     Past Medical History:   has a past medical history of Essential hypertension, Hyperlipidemia, and Pre-diabetes. Social History:   reports that she has never smoked. She has never used smokeless tobacco. She reports that she does not drink alcohol and does not use drugs.      Family History:   Family History   Problem Relation Age of Onset    No Known Problems Mother     No Known Problems Father        Vitals:  BP (!) 151/84   Pulse 68   Temp 97.7 °F (36.5 °C) (Oral)   Resp 18   Ht 5' 4\" (1.626 m)   Wt 145 lb (65.8 kg)   SpO2 98%   BMI 24.89 kg/m²   Temp (24hrs), Av.6 °F (36.4 °C), Min:86.2 °F (30.1 °C), Max:98.2 °F (36.8 °C)    No results for input(s): POCGLU in the last 72 hours. I/O (24Hr): Intake/Output Summary (Last 24 hours) at 1/20/2022 1145  Last data filed at 1/19/2022 1747  Gross per 24 hour   Intake 1141.67 ml   Output 350 ml   Net 791.67 ml       Labs:  Hematology:  Recent Labs     01/18/22 0444 01/19/22 0453   WBC 18.5* 11.7*   RBC 4.19 3.65*   HGB 12.5 11.1*   HCT 38.0 34.4*   MCV 90.7 94.2   MCH 29.8 30.4   MCHC 32.9 32.3   RDW 15.7* 16.4*    145   MPV 11.1 11.6   INR 1.1 1.2     Chemistry:  Recent Labs     01/18/22 0444 01/18/22 0444 01/18/22  1129 01/19/22 0453 01/20/22 0419      < > 142 137 136   K 3.5*   < > 4.6 3.8 4.7      < > 109* 107 106   CO2 22   < > 23 22 17*   GLUCOSE 151*   < > 121* 104* 109*   BUN 17   < > 20 20 26*   CREATININE 0.79   < > 0.75 0.73 0.77   MG 1.4*  --   --   --   --    ANIONGAP 12   < > 10 8* 13   LABGLOM >60   < > >60 >60 >60   GFRAA >60   < > >60 >60 >60   CALCIUM 8.3*   < > 8.3* 8.1* 8.5*   CAION 1.12*  --   --   --   --    PHOS 2.8  --   --   --   --     < > = values in this interval not displayed. Recent Labs     01/18/22 0444 01/19/22 0453 01/20/22 0419   PROT 6.0* 5.3* 5.8*   LABALBU 3.3* 2.6* 2.5*   AST 1,139* 313* 146*   * 572* 403*   ALKPHOS 185* 189* 201*   BILITOT 2.92* 2.19* 1.08   AMYLASE 646*  --   --    LIPASE 1,473* 239*  --    TRIG 55  --   --      ABG:No results found for: POCPH, PHART, PH, POCPCO2, ABP6ZRX, PCO2, POCPO2, PO2ART, PO2, POCHCO3, ZUH8GPA, HCO3, NBEA, PBEA, BEART, BE, THGBART, THB, EAM4JCI, LWXO7GLK, R4YVLRWA, O2SAT, FIO2  Lab Results   Component Value Date/Time    SPECIAL  L HAND 3ML 01/18/2022 09:37 AM     Lab Results   Component Value Date/Time    CULTURE NO GROWTH 2 DAYS 01/18/2022 09:37 AM       Radiology:  NM HEPATOBILIARY    Result Date: 1/19/2022  No excretion of contrast into the extrahepatic biliary tree or small bowel at 2 hours.   Gallbladder is small bowel or visualized at 18 hours, delayed visualization compatible with chronic cholecystitis. RECOMMENDATIONS: Unavailable     US GALLBLADDER RUQ    Result Date: 1/18/2022  1. Cholelithiases with significant gallbladder wall edema can be seen in the setting of pancreatitis, hepatitis or acute cholecystitis. 2. Trace amount of fluid in Man's pouch. Physical Examination:        General appearance:  alert, cooperative and no distress  Mental Status:  oriented to person, place and time and normal affect  Lungs:  clear to auscultation bilaterally, normal effort  Heart:  regular rate and rhythm, no murmur  Abdomen:  soft, nontender, nondistended, normal bowel sounds, no masses, hepatomegaly, splenomegaly has some mild abdominal pain improving since yesterday  Extremities:  no edema, redness, tenderness in the calves  Skin:  no gross lesions, rashes, induration    Assessment:        Hospital Problems           Last Modified POA    * (Principal) Ascending cholangitis 1/18/2022 Yes    Cholecystitis 1/18/2022 Yes    Gallstone pancreatitis 1/18/2022 Yes    Essential hypertension 1/18/2022 Yes    Sepsis ([address] 1/18/2022 Yes    Acute pancreatitis 1/18/2022 Yes          Plan:        1. Sepsis 2/2 Ascending cholangitis from Common bile duct obstruction from gallstones:  ERCP did not show any obstruction, thought that stone has passed since LFT's are improving. Continue antibiotics per ID recommendations, hopefully transition to PO soon. Blood cultures NGDT. 2. Pancreatic mass: EUS showed large mass in pancreatic tail. Oncology consulted. Preliminary cytology concerning for malignancy. CA 19-9 ordered. 3. Gallstone pancreatitis-resolved. Diet advanced, pt tolerating   4. Acute/chronic cholecystitis General surgery consulted: holding off on kathi at this time. 5. DVT ppx  6. PT/OT    Dispo: Plan to discuss results with family today. Oncology consulted given suspicion for malignancy.  If pt/family wishes to continue with workup/treatment, will need to get her set up outpatient .    Helga Dennison,   1/20/2022  11:45 AM

## 2022-01-20 NOTE — PROGRESS NOTES
[facility] [place] [facility]   Progress Note    Rohit Acevedo is a 80 y.o. female patient. Hospitalization Day:2      Chief consult reason:     Cholangitis    Subjective:  Pt seen and examined. Pt had EUS yesterday that showed pancreatic tail mass suspicious in nature. Biopsies were obtained and are pending. No CBD obstruction noted, CBD is WNL. EGD was normal except  Today, she is asymptomatic, tolerated breakfast and is resting    1/19/2022 EGD/EUS per Dr. Tiffanie Donaldson:  EGD Findings:  Esophagus: Normal.   Stomach: Multiple small sessile polyps seen in the body of the stomach. Normal stomach seen during retroflexion view   Duodenum: Large duodenal diverticula seen in the second portion of the duodenum.     EUS findings:  Pancreas: A round mass was identified in the pancreatic tail. . The mass was hypoechoic, heterogenous and solid. The mass measured 10 mm by 12  mm in maximal cross-sectional diameter. The endosonographic borders were well-defined. An intact interface was seen between the mass and the celiac trunk and superior mesenteric artery suggesting a lack of invasion. Fine needle biopsy was performed. Color Doppler imaging was utilized prior to needle puncture to confirm a lack of significant vascular structures within the needle path. Six passes were made with the 25 gauge ultrasound biopsy needle using a transgastric  approach. A stylet was used. A cytologist was present and performed a preliminary cytologic examination. Preliminary cytology is suspicious for malignancy (final results are pending). Estimated blood loss was minimal. Verification of patient identification for the specimen was done by the physician and nurse using the patient's name and medical record number. Rest of the pancreas appeared within normal limits. Examination of the uncinate process limited due to air artifacts from the large duodenal diverticula. PD with hyperechoic ductal walls.  PD in the head 2.6 mm, in the body 1.2 mm and in the tail 1 mm in maximum cross sectional diameter. CBD: No stones, sludge. CBD diameter: 3 mm. Gallbladder: Thickened gall bladder wall up to 3 mm. Extensive sludge present in the gall bladder. Left adrenal: Normal.   Left lobe of liver: A round anechoic non septated cyst measuring 6 x 7 mm was seen in the left lobe of the liver    Lymphadenopathy: No pathologic lymphadenopathy seen in upper abdomen.         Recommendations: Stage T1 N0 MX applies if biopsies consistent with cancer                                      Further recommendations as per inpatient GI team      Electronically signed by Dejon Wild MD  on 2022 at 3:58 PM  VITALS:  BP (!) 151/84   Pulse 68   Temp 97.7 °F (36.5 °C) (Oral)   Resp 18   Ht 5' 4\" (1.626 m)   Wt 145 lb (65.8 kg)   SpO2 98%   BMI 24.89 kg/m²   TEMPERATURE:  Current - Temp: 97.7 °F (36.5 °C); Max - Temp  Av.6 °F (36.4 °C)  Min: 86.2 °F (30.1 °C)  Max: 98.2 °F (36.8 °C)    Physical Assessment:  General appearance:  alert, cooperative and no distress  Mental Status:  oriented to person, place   Lungs:  clear to auscultation bilaterally, normal effort  Heart:  regular rate and rhythm, no murmur  Abdomen:  soft, nontender, nondistended, normal bowel sounds, no masses, hepatomegaly, splenomegaly  Extremities:  no edema, redness, tenderness in the calves  Skin:  no gross lesions, rashes, induration    Data Review:    Labs and Imaging:     CBC:  Recent Labs     22  0444 22  0453   WBC 18.5* 11.7*   HGB 12.5 11.1*   MCV 90.7 94.2   RDW 15.7* 16.4*    145       ANEMIA STUDIES:  No results for input(s): LABIRON, TIBC, FERRITIN, [id number], FOLATE, OCCULTBLD in the last 72 hours.     BMP:  Recent Labs     22  1129 22  0453 22  0419    137 136   K 4.6 3.8 4.7   * 107 106   CO2 23 22 17*   BUN 20 20 26*   CREATININE 0.75 0.73 0.77   GLUCOSE 121* 104* 109*   CALCIUM 8.3* 8.1* 8.5*       LFTS:  Recent Labs     01/18/22  0444 01/19/22  0453 01/20/22  0419   ALKPHOS 185* 189* 201*   * 572* 403*   AST 1,139* 313* 146*   BILITOT 2.92* 2.19* 1.08   LABALBU 3.3* 2.6* 2.5*       Amylase/Lipase and Ammonia:  Recent Labs     01/18/22  0444 01/19/22  0453   AMYLASE 646*  --    LIPASE 1,473* 239*       Acute Hepatitis Panel:  No results found for: HEPBSAG, HEPCAB, HEPBIGM, HEPAIGM    HCV Genotype:  No results found for: HEPATITISCGENOTYPE    HCV Quantitative:  No results found for: HCVQNT    LIVER WORK UP:    AFP  No results found for: AFP    Alpha 1 antitrypsin   No results found for: A1A    BONNIE  No results found for: BONNIE    AMA  No results found for: MITOAB    ASMA  No results found for: SMOOTHMUSCAB    PT/INR  Recent Labs     01/18/22 0444 01/19/22  0453   PROTIME 11.3 12.3   INR 1.1 1.2       Cancer Markers:  CEA:  No results for input(s): CEA in the last 72 hours. Ca 125:  No results for input(s):  in the last 72 hours. Ca 19-9:   Invalid input(s):   AFP: No results for input(s): AFP in the last 72 hours. Lactic acid:Invalid input(s): LACTIC ACID    Radiology Review:    No results found. Principal Problem:    Ascending cholangitis  Active Problems:    Cholecystitis    Gallstone pancreatitis    Essential hypertension    Sepsis ([address]    Acute pancreatitis  Resolved Problems:    * No resolved hospital problems. *       GI Impression:    1. Suspected Pancreatic mass-tumor markers ordered and pending  2. Choledocholithiasis-suspect pt passed stone as LFT's are improving and no obstruction seen on EUS, CBD WNL  3. Chronic Cholecystitis-mgt per GS      Plan and Recommendations:    1. Diet as tolerated  2. Anticipate resolution of elevated LFT's/T. Bili in next few days  3. No further recommendations-GI will sign off      This plan was formulated in collaboration with Dr. Danii Chaves MD    Thank you for allowing me to participate in the care of your patient.   Please feel free to contact me with any questions or concerns. [facility]    This note was created with the assistance of a speech-recognition program.  Although the intention is to generate a document that actually reflects the content of the visit, no guarantees can be provided that every mistake has been identified and corrected by editing.

## 2022-01-20 NOTE — PROGRESS NOTES
Occupational Therapy   Occupational Therapy Initial Assessment  Date: 2022   Patient Name: Nellie Navarro  MRN: [id number]     : 1931    Date of Service: 2022  Chief Complaint   Patient presents with    Abdominal Pain       Discharge Recommendations:   Patient would benefit from continued therapy after discharge  OT Equipment Recommendations  Equipment Needed: Yes  Mobility Devices: ADL Assistive Devices  ADL Assistive Devices: Long-handled Shoe Horn;Reacher;Sock-Aid Hard;Dressing Stick;Long-handled Sponge    Assessment   Performance deficits / Impairments: Decreased functional mobility ; Decreased endurance;Decreased ADL status; Decreased balance;Decreased safe awareness;Decreased high-level IADLs  Prognosis: Good  Decision Making: Medium Complexity  OT Education: OT Role;Plan of Care;ADL Adaptive Strategies;Transfer Training;Precautions; Equipment  Patient Education: Good/fair return from pt  REQUIRES OT FOLLOW UP: Yes  Activity Tolerance  Activity Tolerance: Patient Tolerated treatment well;Patient limited by fatigue  Safety Devices  Safety Devices in place: Yes  Type of devices: Call light within reach; Left in bed;Bed alarm in place;Nurse notified;Gait belt;Patient at risk for falls  Restraints  Initially in place: No         Patient Diagnosis(es): The encounter diagnosis was Acute pancreatitis, unspecified complication status, unspecified pancreatitis type. has a past medical history of Essential hypertension, Hyperlipidemia, and Pre-diabetes. has a past surgical history that includes Appendectomy (N/A); katia and bso (cervix removed) (N/A); Colonoscopy (N/A); Pacemaker insertion (N/A, 2015); pacemaker placement; Upper gastrointestinal endoscopy (2022); Upper gastrointestinal endoscopy (N/A, 2022); and Upper gastrointestinal endoscopy (N/A, 2022).          Restrictions  Restrictions/Precautions  Restrictions/Precautions: General Precautions,Fall Risk  Required Braces or Orthoses?: No  Position Activity Restriction  Other position/activity restrictions: up with A    Subjective   General  Patient assessed for rehabilitation services?: Yes  Family / Caregiver Present: No  Diagnosis: Abd pain, Bilary obstruction, EGD  Patient Currently in Pain: Yes  Pain Assessment  Pain Assessment: 0-10  Pain Level: 2  Pain Type: Acute pain  Pain Location: Abdomen  Pain Orientation: Mid  Pain Descriptors: Aching;Sore;Discomfort  Pain Frequency: Continuous  Non-Pharmaceutical Pain Intervention(s): Ambulation/Increased Activity; Distraction; Emotional support; Therapeutic presence  Response to Pain Intervention: Patient Satisfied  Vital Signs  Patient Currently in Pain: Yes  Social/Functional History  Social/Functional History  Lives With: Son,Spouse (2 adult sons)  Type of Home: House  Home Layout: Two level,Performs ADL's on one level,Able to Live on Main level with bedroom/bathroom (Bed for pt placed in living room)  Bathroom Shower/Tub: Walk-in shower  Bathroom Toilet: Standard  Bathroom Equipment: Built-in shower seat,Grab bars in shower  Bathroom Accessibility: Accessible  Home Equipment: Rolling walker,Cane,Wheelchair-manual (uses all PRN at home)  Receives Help From: Family  ADL Assistance: Needs assistance (Pt states only requiring assistance for LB ADL's from sons/granddtrs)  Homemaking Assistance: Needs assistance (Pt does cook/clean some, granddtrs and sons also perform these tasks, \"I show the granddaughters how to cook/clean properly\" per pt)  Homemaking Responsibilities: Yes  Ambulation Assistance: Independent (using cane/RW, sometimes uses w/c for func mob depending on the day, pt vague on these details)  Transfer Assistance: Independent  Active : No  Patient's  Info:  Son/dtr  Mode of Transportation: Family  Occupation: Retired  Leisure & Hobbies: romance novels  Additional Comments: Spouse has many health concerns, 24 hour supervision available from sons, dtrs live close by as well     Objective   Vision: Impaired  Vision Exceptions: Wears glasses for reading  Hearing: Within functional limits    Orientation  Overall Orientation Status: Impaired  Orientation Level: Oriented to person;Disoriented to time;Oriented to situation;Oriented to place (Year is \"2861\")     Balance  Sitting Balance: Supervision  Standing Balance: Stand by assistance  Standing Balance  Time: 2 min  Activity: Pt stood bedside, short func mob in room  Functional Mobility  Functional - Mobility Device: Rolling Walker  Activity: To/from bathroom  Assist Level: Contact guard assistance  Functional Mobility Comments: Slow pace, may not be far from baseline, fall risk if unsupervised however, RW required this date  ADL  Feeding: Modified independent ;Setup; Increased time to complete  Grooming: Supervision;Setup; Increased time to complete  UE Bathing: Stand by assistance; Increased time to complete  LE Bathing: Minimal assistance; Increased time to complete  UE Dressing: Contact guard assistance  LE Dressing: Minimal assistance; Increased time to complete  Toileting: Increased time to complete; Moderate assistance  Additional Comments: Pt with some difficulty with BLE ADL's at baseline, pt was able to finish R sock by bring foot up to EOB after writer started it over toes, pt fatigues quickly, some unsteadiness on feet, combed hair while sitting EOB easily, pt feeding self at end of session-pt looked at yogurt however could not name this food item  Tone RUE  RUE Tone: Normotonic  Tone LUE  LUE Tone: Normotonic  Coordination  Movements Are Fluid And Coordinated: No  Coordination and Movement description: Decreased speed     Bed mobility  Supine to Sit: Minimal assistance  Sit to Supine: Stand by assistance  Scooting: Contact guard assistance  Comment: Pt slow to process getting to L side of bed and required min A, pt quickly transferred from EOB>supine this date however, supine in bed upon arrival/exit this date with alarm on  Transfers  Sit to stand: Minimal assistance  Stand to sit: Contact guard assistance  Transfer Comments: RW used     Cognition  Overall Cognitive Status: Exceptions  Following Commands: Follows multistep commands with repitition; Follows multistep commands with increased time  Safety Judgement: Decreased awareness of need for assistance  Insights: Decreased awareness of deficits        Sensation  Overall Sensation Status: WFL      LUE AROM (degrees)  LUE AROM : WFL  RUE AROM (degrees)  RUE AROM : WFL  LUE Strength  Gross LUE Strength: WFL  L Hand General: 5/5  RUE Strength  Gross RUE Strength: WFL  R Hand General: 5/5      Plan   Plan  Times per week: 3-5x    AM-PAC Score        AM-PAC Inpatient Daily Activity Raw Score: 18 (01/20/22 1128)  AM-PAC Inpatient ADL T-Scale Score : 38.66 (01/20/22 1128)  ADL Inpatient CMS 0-100% Score: 46.65 (01/20/22 1128)  ADL Inpatient CMS G-Code Modifier : CK (01/20/22 1128)    Goals  Short term goals  Time Frame for Short term goals: Pt will by discharge  Short term goal 1: demo good safety awareness during func mob around room using RW and mod I  Short term goal 2: demo Mod I for all bed mob/func transfers using bed rails/LRD PRN  Short term goal 3: demo ADL UB bathing/dressing activity with mod I only  Short term goal 4: demo ADL LB bathing/dressing activity with SBA, sock-aid/reacher PRN  Short term goal 5: demo activity tolerance for 35 min+       Therapy Time   Individual Concurrent Group Co-treatment   Time In 0845         Time Out 0910         Minutes 25         Timed Code Treatment Minutes: [facility] OTR/L

## 2022-01-20 NOTE — PROGRESS NOTES
[facility] -   Infectious diseases evaluation  admission date 1/17/2022    reason for consultation:   Ascending cholangitis    Impression :   Current:  · Ascending cholangitis   · Acute Cholecystitis w biliary obstruction  · Pancreatitis - likely gallstone   · Pancreatic tail tumor  · Leukocytosis     Other:  ·   Discussion / summary of stay / plan of care   ·   Recommendations     · On zosyn   ·  HIDA - acute kathi and biliary obst picture  · EUS ERCP 1/19 - pend biopsy of the tail of the pancreas   · cannot get MRI due to pacemaker not being compatible   · Plan for IR guided cholecystostomy tube tomorrow - not a surgical candidate  · Plan discussed with daughter - she is agreeable     Infection Control Recommendations   · [place] Precautions      Antimicrobial Stewardship Recommendations   · Simplification of therapy  · Targeted therapy    Coordination ofOutpatient Care:   · Estimated Length of IV antimicrobials:  · Patient will need Midline / picc Catheter Insertion:   · Patient will need SNF:  · Patient will need outpatient wound care:     History of Present Illness:   Initial history:  Tasia Ludwig is a 80y.o.-year-old female who presented to the ED from [facility] for concerns of acute cholecystitis vs pancreatitis. She was having abd pain and nausea/vomiting with  and , elevated total bilirubin of 2.2, indirect bilirubin 1.2, lipase 80876, and WBC 11.5. CT abd pelvis w/ contrast showed dilation and wall thickening of the gallbladder and a dilated common bile duct with fat stranding around the pancreas. She was transferred here to see GI for possible ERCP and GS evaluation. On my exam ,after the HIDA.  Pleasant confused and no abd pain or tenderness - no nausea or vomiting at this time  Not in distress  No cellultiis      Interval changes  1/20/2022   BP (!) 151/84   Pulse 68   Temp 97.7 °F (36.5 °C) (Oral)   Resp 18   Ht 5' 4\" (1.626 m)   Wt 145 lb (65.8 kg)   SpO2 98%   BMI 24.89 kg/m²    Remains afebrile. No abd pain and feels ok  HIDA showed acute cholecystitis with biliary obstruction   GI -EUS ERCP 1/19: round mass identified in pancreatic tail, was biopsied -frozen cytology concerning for cancer. Final cytology pending. Gallbladder had a thickened wall up to 3 mm, extensive sludge present. CBD had no stones or sludge. She has had some confusion and was slightly aggressive with nursing. WBC 11. All of her liver labs are showing improvement. No ERCP done, no stones in gallbladder seen. Not a surgical candidate. Plan for IR guided cholecystostomy tube tomorrow -- daughter agreeable.  - med management    Summary of relevant labs:  Labs:  WBC 18.5 --> 11.7  Cr 0.79   -->572-->403  AST 1139 --> 313-->146  Bilirubin 2.92 --> 2.19  Lipase 1473 -->239    Micro:  Blood cx 1/18: no growth    Imaging:  HIDA 1/18: Acute cholecystitis with biliary obstruction  US gallbladder RUQ 1/18: cholelithiasis with significant gallbladder wall edema seen in the setting of pancreatitis, hepatitis or acute cholecystitis. Trace amount of fluid and Man's pouch. I have personally reviewed the past medical history, past surgical history, medications, social history, and family history, and I haveupdated the database accordingly.   Past Medical History:     Past Medical History:   Diagnosis Date    Essential hypertension     Hyperlipidemia     Pre-diabetes        Past Surgical  History:     Past Surgical History:   Procedure Laterality Date    APPENDECTOMY N/A     COLONOSCOPY N/A     PACEMAKER INSERTION N/A 12/30/2015    PACEMAKER PLACEMENT      [employer] pacemaker, NOT MRI SAFE per [employer]- krm    KIRSTEN AND BSO N/A     UPPER GASTROINTESTINAL ENDOSCOPY  01/19/2022    w/EUS FNA       Medications:      morphine  2 mg IntraVENous Once    sodium chloride flush  5-40 mL IntraVENous 2 times per day    [Held by provider] enoxaparin  40 mg SubCUTAneous Daily    atorvastatin  40 mg Oral Nightly    [Held by provider] losartan  100 mg Oral Daily    pantoprazole  40 mg Oral QAM AC    midodrine  2.5 mg Oral TID WC    piperacillin-tazobactam  3,375 mg IntraVENous Q8H       Social History:     Social History     Socioeconomic History    Marital status:      Spouse name: Not on file    Number of children: Not on file    Years of education: Not on file    Highest education level: Not on file   Occupational History    Not on file   Tobacco Use    Smoking status: Never Smoker    Smokeless tobacco: Never Used   Substance and Sexual Activity    Alcohol use: Never    Drug use: Never    Sexual activity: Not on file   Other Topics Concern    Not on file   Social History Narrative    Not on file     Social Determinants of Health     Financial Resource Strain:     Difficulty of Paying Living Expenses: Not on file   Food Insecurity:     Worried About Running Out of Food in the Last Year: Not on file    Fred of Food in the Last Year: Not on file   Transportation Needs:     Lack of Transportation (Medical): Not on file    Lack of Transportation (Non-Medical):  Not on file   Physical Activity:     Days of Exercise per Week: Not on file    Minutes of Exercise per Session: Not on file   Stress:     Feeling of Stress : Not on file   Social Connections:     Frequency of Communication with Friends and Family: Not on file    Frequency of Social Gatherings with Friends and Family: Not on file    Attends Yazdanism Services: Not on file    Active Member of Clubs or Organizations: Not on file    Attends Club or Organization Meetings: Not on file    Marital Status: Not on file   Intimate Partner Violence:     Fear of Current or Ex-Partner: Not on file    Emotionally Abused: Not on file    Physically Abused: Not on file    Sexually Abused: Not on file   Housing Stability:     Unable to Pay for Housing in the Last Year: Not on file    Number of Places Lived in the Last Year: Not on file    Unstable Housing in the Last Year: Not on file       Family History:     Family History   Problem Relation Age of Onset    No Known Problems Mother     No Known Problems Father         Allergies:   Adhesive tape     Review of Systems:     Review of Systems   Constitutional: Negative for activity change, chills and fever. HENT: Negative for congestion and rhinorrhea. Eyes: Negative for discharge, redness, itching and visual disturbance. Respiratory: Negative for cough and shortness of breath. Cardiovascular: Negative for chest pain and leg swelling. Gastrointestinal: Positive for nausea and vomiting. Negative for abdominal pain. Endocrine: Negative for heat intolerance and polyphagia. Genitourinary: Negative for dysuria and frequency. Musculoskeletal: Negative for arthralgias and myalgias. Skin: Negative for color change and rash. Allergic/Immunologic: Negative for immunocompromised state. Neurological: Negative for dizziness, light-headedness and headaches. Hematological: Negative for adenopathy. Psychiatric/Behavioral: Positive for confusion. Negative for agitation and sleep disturbance. The patient is not nervous/anxious. Physical Examination :     Patient Vitals for the past 8 hrs:   BP Temp Temp src Pulse Resp SpO2   01/20/22 0753 (!) 151/84 97.7 °F (36.5 °C) Oral 68 18 98 %   01/20/22 0515      99 %       Physical Exam  Constitutional:       Appearance: She is normal weight. She is not ill-appearing. HENT:      Head: Normocephalic and atraumatic. Nose: Nose normal.      Mouth/Throat:      Mouth: Mucous membranes are moist.      Pharynx: Oropharynx is clear. Eyes:      General: No scleral icterus. Extraocular Movements: Extraocular movements intact. Conjunctiva/sclera: Conjunctivae normal.   Cardiovascular:      Rate and Rhythm: Normal rate and regular rhythm.       Pulses: Normal pulses. Heart sounds: Normal heart sounds. Pulmonary:      Effort: Pulmonary effort is normal. No respiratory distress. Breath sounds: Normal breath sounds. Abdominal:      General: Abdomen is flat. Palpations: Abdomen is soft. Tenderness: There is no abdominal tenderness. There is no guarding. Genitourinary:     Comments: No proctor  No CVA tenderness  Musculoskeletal:         General: No swelling or tenderness. Normal range of motion. Cervical back: Normal range of motion and neck supple. Right lower leg: No edema. Left lower leg: No edema. Skin:     General: Skin is warm and dry. Capillary Refill: Capillary refill takes less than 2 seconds. Neurological:      Mental Status: She is alert. Mental status is at baseline. She is disoriented. Cranial Nerves: No cranial nerve deficit. Sensory: No sensory deficit. Coordination: Coordination normal.   Psychiatric:         Behavior: Behavior normal.         Thought Content: Thought content normal.           Medical Decision Making:   I have independently reviewed/ordered the following labs:    CBC with Differential:   Recent Labs     01/18/22 0444 01/19/22 0453   WBC 18.5* 11.7*   HGB 12.5 11.1*   HCT 38.0 34.4*    145   LYMPHOPCT  --  16*   MONOPCT  --  6     BMP:  Recent Labs     01/18/22  0444 01/18/22  1129 01/19/22  0453 01/20/22  0419      < > 137 136   K 3.5*   < > 3.8 4.7      < > 107 106   CO2 22   < > 22 17*   BUN 17   < > 20 26*   CREATININE 0.79   < > 0.73 0.77   MG 1.4*  --   --   --     < > = values in this interval not displayed. Hepatic Function Panel:   Recent Labs     01/19/22 0453 01/20/22  0419   PROT 5.3* 5.8*   LABALBU 2.6* 2.5*   BILITOT 2.19* 1.08   ALKPHOS 189* 201*   * 403*   * 146*     No results for input(s): RPR in the last 72 hours. No results for input(s): HIV in the last 72 hours.   No results for input(s): BC in the last 72 hours. Lab Results   Component Value Date    CREATININE 0.77 01/20/2022    GLUCOSE 109 01/20/2022     Thank you for allowing us to participate in the care of this patient. Please call with questions. This note is created with the assistance of a speech recognition program.  While intending to generate adocument that actually reflects the content of the visit, the document can still have some errors including those of syntax and sound a like substitutions which may escape proof reading. It such instances, actual meaningcan be extrapolated by contextual diversion. Abbi Moreau, OMS-IV         I have discussed the care of the patient, including pertinent history and exam findings,  with the resident. I have seen and examined the patient and the key elements of all parts of the encounter have been performed by me. I agree with the assessment, plan and orders as documented by the resident.     Lana Garibay, Infectious Diseases

## 2022-01-20 NOTE — FLOWSHEET NOTE
Family member, Radha Contreras, her son, identification verified and updated.   Narrator relates need to speak with patient about the possibility of gall bladder removal.

## 2022-01-20 NOTE — ANESTHESIA POSTPROCEDURE EVALUATION
Department of Anesthesiology  Postprocedure Note    Patient: Raymond Buckner  MRN: [id number]  YOB: 1931  Date of evaluation: 1/19/2022  Time:  10:56 PM     Procedure Summary     Date: 01/19/22 Room / Location: [address]    Anesthesia Start: 1449 Anesthesia Stop: 1619    Procedures:       EGD DIAGNOSTIC ONLY (N/A )      EGD W/EUS FNA (N/A ) Diagnosis: (BILIARY OBSTRUCTION)    Surgeons: Daniella Smith MD Responsible Provider: Rachel Crockett MD    Anesthesia Type: general ASA Status: 3          Anesthesia Type: general    Desmond Phase I: Desmond Score: 8    Desmond Phase II:      Last vitals: Reviewed and per EMR flowsheets.        Anesthesia Post Evaluation    Patient location during evaluation: PACU  Patient participation: complete - patient participated  Level of consciousness: awake and alert  Airway patency: patent  Nausea & Vomiting: no nausea and no vomiting  Complications: no  Cardiovascular status: blood pressure returned to baseline  Respiratory status: acceptable  Hydration status: euvolemic

## 2022-01-21 ENCOUNTER — APPOINTMENT (OUTPATIENT)
Dept: CT IMAGING | Age: 87
DRG: 871 | End: 2022-01-21
Payer: MEDICARE

## 2022-01-21 VITALS
OXYGEN SATURATION: 97 % | HEART RATE: 65 BPM | BODY MASS INDEX: 27.31 KG/M2 | WEIGHT: 160 LBS | SYSTOLIC BLOOD PRESSURE: 162 MMHG | RESPIRATION RATE: 16 BRPM | HEIGHT: 64 IN | TEMPERATURE: 97.5 F | DIASTOLIC BLOOD PRESSURE: 86 MMHG

## 2022-01-21 LAB
CA 19-9: 109 U/ML (ref 0–35)
CA 19-9: 137 U/ML (ref 0–35)
SURGICAL PATHOLOGY REPORT: NORMAL

## 2022-01-21 PROCEDURE — C1751 CATH, INF, PER/CENT/MIDLINE: HCPCS

## 2022-01-21 PROCEDURE — 99239 HOSP IP/OBS DSCHRG MGMT >30: CPT | Performed by: INTERNAL MEDICINE

## 2022-01-21 PROCEDURE — 99232 SBSQ HOSP IP/OBS MODERATE 35: CPT | Performed by: INTERNAL MEDICINE

## 2022-01-21 PROCEDURE — 71260 CT THORAX DX C+: CPT

## 2022-01-21 PROCEDURE — 6360000004 HC RX CONTRAST MEDICATION: Performed by: INTERNAL MEDICINE

## 2022-01-21 PROCEDURE — 6360000002 HC RX W HCPCS: Performed by: INTERNAL MEDICINE

## 2022-01-21 PROCEDURE — 2580000003 HC RX 258: Performed by: INTERNAL MEDICINE

## 2022-01-21 PROCEDURE — 05HY33Z INSERTION OF INFUSION DEVICE INTO UPPER VEIN, PERCUTANEOUS APPROACH: ICD-10-PCS | Performed by: INTERNAL MEDICINE

## 2022-01-21 PROCEDURE — 97116 GAIT TRAINING THERAPY: CPT

## 2022-01-21 PROCEDURE — 97110 THERAPEUTIC EXERCISES: CPT

## 2022-01-21 PROCEDURE — 76937 US GUIDE VASCULAR ACCESS: CPT

## 2022-01-21 RX ORDER — FUROSEMIDE 10 MG/ML
20 INJECTION INTRAMUSCULAR; INTRAVENOUS ONCE
Status: COMPLETED | OUTPATIENT
Start: 2022-01-21 | End: 2022-01-21

## 2022-01-21 RX ADMIN — IOPAMIDOL 75 ML: 755 INJECTION, SOLUTION INTRAVENOUS at 13:16

## 2022-01-21 RX ADMIN — FUROSEMIDE 20 MG: 10 INJECTION, SOLUTION INTRAMUSCULAR; INTRAVENOUS at 16:25

## 2022-01-21 RX ADMIN — ERTAPENEM 1000 MG: 1 INJECTION INTRAMUSCULAR; INTRAVENOUS at 16:25

## 2022-01-21 RX ADMIN — PIPERACILLIN AND TAZOBACTAM 3375 MG: 3; .375 INJECTION, POWDER, FOR SOLUTION INTRAVENOUS at 02:30

## 2022-01-21 RX ADMIN — PIPERACILLIN AND TAZOBACTAM 3375 MG: 3; .375 INJECTION, POWDER, FOR SOLUTION INTRAVENOUS at 11:20

## 2022-01-21 ASSESSMENT — ENCOUNTER SYMPTOMS
RHINORRHEA: 0
EYE ITCHING: 0
EYE REDNESS: 0
EYE DISCHARGE: 0
COUGH: 0
NAUSEA: 0
ABDOMINAL PAIN: 0
VOMITING: 0
SHORTNESS OF BREATH: 0
COLOR CHANGE: 0

## 2022-01-21 NOTE — PROGRESS NOTES
Physical Therapy  [facility]  Daily Treatment Note  NAME: Estrada Mejia  : 1931  MRN: [id number]    Date of Service: 2022    Discharge Recommendations:  Patient would benefit from continued therapy after discharge   PT Equipment Recommendations  Equipment Needed: No (pt owns RW)    Assessment   Body structures, Functions, Activity limitations: Decreased functional mobility ; Decreased strength;Decreased endurance  Assessment: The pt ambulated 30 ft with a RW x CGA. She fatigued quickly with mobilization but reported no increase in pain, grossly steady with RW. She could benefit from a continuation of PT for gait and strengthening following her DC  Prognosis: Good  PT Education: Goals; General Safety;Gait Training;PT Role;Functional Mobility Training;Home Exercise Program;Transfer Training;Energy Conservation  REQUIRES PT FOLLOW UP: Yes  Activity Tolerance  Activity Tolerance: Patient limited by endurance; Patient Tolerated treatment well     Patient Diagnosis(es): The encounter diagnosis was Acute pancreatitis, unspecified complication status, unspecified pancreatitis type. has a past medical history of Essential hypertension, Hyperlipidemia, and Pre-diabetes. has a past surgical history that includes Appendectomy (N/A); katia and bso (cervix removed) (N/A); Colonoscopy (N/A); Pacemaker insertion (N/A, 2015); pacemaker placement; Upper gastrointestinal endoscopy (2022); Upper gastrointestinal endoscopy (N/A, 2022); and Upper gastrointestinal endoscopy (N/A, 2022). Restrictions  Restrictions/Precautions  Restrictions/Precautions: General Precautions,Fall Risk  Required Braces or Orthoses?: No  Position Activity Restriction  Other position/activity restrictions: up with assist  Subjective   General  Response To Previous Treatment: Patient with no complaints from previous session.   Family / Caregiver Present: No  Subjective  Subjective: Pt and RN agreeable to PT. Pt with aid in restroom upon arrival. Very pleasant and cooperative t/o. General Comment  Comments: Pt left in bed with call light within reach  Pain Screening  Patient Currently in Pain: Denies  Vital Signs  Patient Currently in Pain: Denies       Orientation  Orientation  Overall Orientation Status: Within Functional Limits  Cognition      Objective   Bed mobility  Bridging: Stand by assistance  Rolling to Right: Stand by assistance  Supine to Sit:  (pt in restroom upon arrival)  Sit to Supine: Contact guard assistance  Scooting: Stand by assistance  Comment: increased time to complete, HOB elevated. Transfers  Sit to Stand: Minimal Assistance  Stand to sit: Contact guard assistance  Bed to Chair: Contact guard assistance  Comment: Danielle from low seat of toilet, vc's for UE placement with good return demo  Ambulation  Ambulation?: Yes  Ambulation 1  Surface: level tile  Device: Rolling Walker  Other Apparatus:  (IV pole)  Assistance: Contact guard assistance  Quality of Gait: flexed posture, shuffling gait  Gait Deviations: Slow Demetria; Shuffles;Decreased step length  Distance: 30ft  Comments: pt declined further amb secondary to fatigue, grossly steady with RW  Stairs/Curb  Stairs?: No     Balance  Posture: Fair  Sitting - Static: Good  Sitting - Dynamic: Good;- (pt able to maintain seated EOB with SBA ~11 mins for seated B LE exs)  Standing - Static: Fair;+  Standing - Dynamic: Fair  Comments: standing balance assessed with RW  Exercises  Seated LE exercise program: Long Arc Quads, hip abduction/adduction, heel/toe raises, and marches. Reps: 15x bilat  Supine Exercises: Gluteal sets, Hamstring Sets, Heel Slides, Quad Sets, SLR.  Reps:10x bilat  Comments:     AM-PAC Score  AM-PAC Inpatient Mobility Raw Score : 18 (01/21/22 1058)  AM-PAC Inpatient T-Scale Score : 43.63 (01/21/22 1058)  Mobility Inpatient CMS 0-100% Score: 46.58 (01/21/22 1058)  Mobility Inpatient CMS G-Code Modifier : CK (01/21/22 1058)    Goals  Short term goals  Time Frame for Short term goals: 10 visits  Short term goal 1: transfers with SBA  Short term goal 2: amb 125 ft with a RW x SBA  Short term goal 3: ascend/descend 4 steps with SBA  Short term goal 4: 20 min exercise program x SBA  Patient Goals   Patient goals : Return home    Plan    Plan  Times per week: 5-6x wk  Current Treatment Recommendations: Strengthening,Functional Mobility Training,Transfer Training,Gait Training,Safety Education & Training,Balance Training,Endurance Training,Stair training  Safety Devices  Type of devices: Nurse notified,Left in bed,Call light within reach,Gait belt  Restraints  Initially in place: No     Therapy Time   Individual Concurrent Group Co-treatment   Time In 0841         Time Out 0906         Minutes 25         Timed Code Treatment Minutes: [address], [place]

## 2022-01-21 NOTE — PROGRESS NOTES
Patient's bed alarm went off, Mac Duarte went in room and saw that patient had self-removed her midline IV. Other RN had been in room 10 minutes prior and midline was intact at that time. Catheter was intact and bleeding had been minimal, then stopped. RN Access called to obtain another midline for discharge. Patient's family updated.     Electronically signed by Gab Huffman RN on 1/21/2022 at 4:50 PM

## 2022-01-21 NOTE — FLOWSHEET NOTE
Assessment: Patient was awake and alert when  visited. Family was not present at the time. Patient remained calm and seemed to appreciate the treatment and care she was receiving. When asked how she was feeling, patient responded; \"good. They took me down brought me back to the room. \" Patient expressed john in God in the power of prayers. Intervention:  maintained listening presence, offered and prayed with patient. Outcome: Patient expressed appreciation for the prayer said with her. Plan: Follow up visits recommended for more spiritual and emotional support. 01/21/22 1439   Encounter Summary   Services provided to: Patient   Support System Family members   Continue Visiting   (01/21/2022)   Complexity of Encounter Moderate   Length of Encounter 15 minutes   Spiritual Assessment Completed Yes   Routine   Type Initial   Assessment Calm; Approachable   Intervention Active listening;Prayer;[place]   Outcome Expressed gratitude   Spiritual/Protestant   Type Spiritual support

## 2022-01-21 NOTE — PROGRESS NOTES
[facility]  Office: [address], DO, Anu March, DO, Sandro Orozco, DO, Kelton Hernandez Blood, DO, Yves Randolph MD, Tomas Mcgovern MD, Graciela Sandoval MD, Jim Lopez MD, Jhonny Siddiqui MD, Zack Alamo MD, Asher Cortez MD, Slime Del Cid, DO, Paulina Jakcson, DO, Leonardo Escobar MD,  Radha Petit, DO, Danii Vega MD, Rhina Caban MD, Caty Ruelas MD, Dari Matthew MD, Michelle Alejo MD, Mayur Romo MD, Demi Price MD, Efrain Jimenez, [facility], [facility], CNP, Maicol Chang, CNP, [facility], CNS, Eleonora Tanner, CNP, Mandy Burnham, CNP, Tamiko Mueller, CNP, Charleen Salinas, CNP, Malvin Gibson, CNP, RICHARD FelipeC, [facility], Claudette Anis, [facility], Jhoan Del Real, CNP, Liu Dudley, CNP, [facility], CNP, Danny Horner CNP, Maribel Beck, [facility], Maricruz Marrero, [address] [place] [address]    Progress Note    1/21/2022    8:28 AM    Name:   Heydi Bhakta  MRN:     [id number]     Acct:      [de-identified]   Room:   [address] [place] [address] Day:  3  Admit Date:  1/17/2022 11:22 PM    PCP:   Nereida Peace DO  Code Status:  Full Code    Subjective:     C/C:   Chief Complaint   Patient presents with    Abdominal Pain     Interval History Status: improved. Patient doing well in no acute distress. She is in good spirits. Denies any chest pain, SOB, difficulty breathing, nausea, vomiting diarrhea or abdominal pain. Brief History: This is a 5year-old female who initially presented to hospital with right upper quadrant abdominal pain. AST 1139, , alk phos 185, bilirubin 2.92. Ultrasound of the gallbladder showed cholelithiasis with significant gallbladder wall edema. HIDA scan showed findings consistent with cholecystitis. General surgery was consulted, plan for cholecystectomy prior to discharge.   Otherwise, patient underwent EUS on 1/19/2022 which showed: a 10 mm by 12 mm mass in pancreatic tail, preliminary cytology suspicious for malignancy. No stone was seen but CBD did show gall bladder wall thickening with sludge. Oncology was consulted. Review of Systems:     Constitutional:  negative for chills, fevers, sweats  Respiratory:  negative for cough, dyspnea on exertion, shortness of breath, wheezing  Cardiovascular:  negative for chest pain, chest pressure/discomfort, lower extremity edema, palpitations  Gastrointestinal:  negative for abdominal pain, constipation, diarrhea, nausea, vomiting  Neurological:  negative for dizziness, headache    Medications: Allergies: Allergies   Allergen Reactions    Adhesive Tape        Current Meds:   Scheduled Meds:    morphine  2 mg IntraVENous Once    sodium chloride flush  5-40 mL IntraVENous 2 times per day    [Held by provider] enoxaparin  40 mg SubCUTAneous Daily    atorvastatin  40 mg Oral Nightly    [Held by provider] losartan  100 mg Oral Daily    pantoprazole  40 mg Oral QAM AC    midodrine  2.5 mg Oral TID WC    piperacillin-tazobactam  3,375 mg IntraVENous Q8H     Continuous Infusions:    sodium chloride       PRN Meds: sodium chloride flush, sodium chloride, potassium chloride, magnesium sulfate, acetaminophen, HYDROcodone 5 mg - acetaminophen **OR** HYDROcodone 5 mg - acetaminophen, HYDROmorphone **OR** HYDROmorphone, ondansetron **OR** ondansetron    Data:     Past Medical History:   has a past medical history of Essential hypertension, Hyperlipidemia, and Pre-diabetes. Social History:   reports that she has never smoked. She has never used smokeless tobacco. She reports that she does not drink alcohol and does not use drugs.      Family History:   Family History   Problem Relation Age of Onset    No Known Problems Mother     No Known Problems Father        Vitals:  BP (!) 146/80   Pulse 66   Temp 97.5 °F (36.4 °C) (Axillary)   Resp 16   Ht 5' 4\" (1.626 m)   Wt 160 lb (72.6 kg)   SpO2 98%   BMI 27.46 kg/m²   Temp (24hrs), Av.3 °F (36.3 °C), Min:97 °F (36.1 °C), Max:97.5 °F (36.4 °C)    No results for input(s): POCGLU in the last 72 hours. I/O (24Hr): Intake/Output Summary (Last 24 hours) at 2022 08  Last data filed at 2022 1634  Gross per 24 hour   Intake 2352 ml   Output    Net 2352 ml       Labs:  Hematology:  Recent Labs     22   WBC 11.7*   RBC 3.65*   HGB 11.1*   HCT 34.4*   MCV 94.2   MCH 30.4   MCHC 32.3   RDW 16.4*      MPV 11.6   INR 1.2     Chemistry:  Recent Labs     22  2244    136 136   K 3.8 4.7 4.5    106 107   CO2 22 17* 20   GLUCOSE 104* 109* 152*   BUN 20 26* 33*   CREATININE 0.73 0.77 0.76   ANIONGAP 8* 13 9   LABGLOM >60 >60 >60   GFRAA >60 >60 >60   CALCIUM 8.1* 8.5* 8.0*     Recent Labs     22  2244   PROT 5.3* 5.8* 5.2*   LABALBU 2.6* 2.5* 2.5*   * 146* 74*   * 403* 268*   ALKPHOS 189* 201* 148*   BILITOT 2.19* 1.08 0.66   LIPASE 239*  --   --      ABG:No results found for: POCPH, PHART, PH, POCPCO2, QRB5TKA, PCO2, POCPO2, PO2ART, PO2, POCHCO3, OUS1JBC, HCO3, NBEA, PBEA, BEART, BE, THGBART, THB, RJS9ZHW, IZMW3VIZ, V9NWQVDF, O2SAT, FIO2  Lab Results   Component Value Date/Time    SPECIAL  L HAND 3ML 2022 09:37 AM     Lab Results   Component Value Date/Time    CULTURE NO GROWTH 2 DAYS 2022 09:37 AM       Radiology:  NM HEPATOBILIARY    Result Date: 2022  No excretion of contrast into the extrahepatic biliary tree or small bowel at 2 hours. Gallbladder is small bowel or visualized at 18 hours, delayed visualization compatible with chronic cholecystitis. RECOMMENDATIONS: Unavailable     US GALLBLADDER RUQ    Result Date: 2022  1. Cholelithiases with significant gallbladder wall edema can be seen in the setting of pancreatitis, hepatitis or acute cholecystitis. 2. Trace amount of fluid in Man's pouch.        Physical Examination:        General appearance:  alert, cooperative and no distress  Mental Status:  oriented to person, place and time and normal affect  Lungs:  clear to auscultation bilaterally, normal effort  Heart:  regular rate and rhythm, no murmur  Abdomen:  soft, nontender, nondistended, normal bowel sounds, no masses, hepatomegaly, splenomegaly has some mild abdominal pain improving since yesterday  Extremities:  no edema, redness, tenderness in the calves  Skin:  no gross lesions, rashes, induration    Assessment:        Hospital Problems           Last Modified POA    * (Principal) Ascending cholangitis 1/18/2022 Yes    Cholecystitis 1/18/2022 Yes    Gallstone pancreatitis 1/18/2022 Yes    Essential hypertension 1/18/2022 Yes    Sepsis ([address] 1/18/2022 Yes    Acute pancreatitis 1/18/2022 Yes    Pancreatic mass 1/20/2022 Yes    Chronic cholecystitis 1/20/2022 Yes          Plan:        1. Sepsis 2/2 Ascending cholangitis from Common bile duct obstruction from gallstones:  ERCP did not show any obstruction, thought that stone has passed since LFT's are improving. Continue antibiotics per ID recommendations, hopefully transition to PO soon. Blood cultures NGDT. After discussion with IR, ID, Oncology, Surgery, decision made to hold cholecystomy  tube placement. If malignancy confirmed, patient would like to have palliative/hospice evaluation. If abdominal pain returned, patient would like to have surgery eval.  2. Pancreatic mass: EUS showed large mass in pancreatic tail. Discussed with Oncology, will get CT chest for further evaluation then can be discharged with outpt follow up. Rest of workup can be done outpatient . Preliminary cytology concerning for malignancy. CA 19-9 ordered. 3. Gallstone pancreatitis-resolved. Diet advanced, pt tolerating   4. Acute/chronic cholecystitis General surgery consulted: holding off on kathi at this time. 5. DVT ppx  6. PT/OT    Dispo: d/c today .    Rafaela Marshall DO  1/21/2022  8:28 AM

## 2022-01-21 NOTE — PLAN OF CARE
Problem: Falls - Risk of:  Goal: Will remain free from falls  Description: Will remain free from falls  Outcome: Ongoing  Goal: Absence of physical injury  Description: Absence of physical injury  Outcome: Ongoing     Problem: Activity:  Goal: Risk for activity intolerance will decrease  Description: Risk for activity intolerance will decrease  Outcome: Ongoing     Problem:  Bowel/Gastric:  Goal: Bowel function will improve  Description: Bowel function will improve  Outcome: Ongoing  Goal: Diagnostic test results will improve  Description: Diagnostic test results will improve  Outcome: Ongoing  Goal: Occurrences of nausea will decrease  Description: Occurrences of nausea will decrease  Outcome: Ongoing  Goal: Occurrences of vomiting will decrease  Description: Occurrences of vomiting will decrease  Outcome: Ongoing     Problem: Fluid Volume:  Goal: Maintenance of adequate hydration will improve  Description: Maintenance of adequate hydration will improve  Outcome: Ongoing     Problem: Health Behavior:  Goal: Ability to state signs and symptoms to report to health care provider will improve  Description: Ability to state signs and symptoms to report to health care provider will improve  Outcome: Ongoing     Problem: Physical Regulation:  Goal: Complications related to the disease process, condition or treatment will be avoided or minimized  Description: Complications related to the disease process, condition or treatment will be avoided or minimized  Outcome: Ongoing  Goal: Ability to maintain clinical measurements within normal limits will improve  Description: Ability to maintain clinical measurements within normal limits will improve  Outcome: Ongoing     Problem: Sensory:  Goal: Ability to identify factors that increase the pain will improve  Description: Ability to identify factors that increase the pain will improve  Outcome: Ongoing  Goal: Ability to notify healthcare provider of pain before it becomes

## 2022-01-21 NOTE — PROGRESS NOTES
Today's Date: 1/21/2022  Patient Name: Atilio Juan  Date of admission: 1/17/2022 11:22 PM  Patient's age: 80 y. o., 12/1/1931  Admission Dx: Cholecystitis [K81.9]  Acute pancreatitis, unspecified complication status, unspecified pancreatitis type [K85.90]    Reason for Consult: 10 mm by 12 mm mass in pancreatic tail. Requesting Physician: Lily Ochoa DO    Chief Complaint:  Abdominal Pain. History Obtained From:  patient, electronic medical record    Interval Changes:  Patient seen and examined  Labs and vitals reviewed  Patient is afebrile and hemodynamically stable. Saturating 98% on room air. CA 19-9 137. Surgical Pathology pending. Awaiting CT Chest.     History of Present Illness: The patient is a 80 y.o. female who is admitted to the hospital for ascending cholangitis. Per patient she experienced some abdominal pain, nausea, vomiting and fatigue and was taken to [facility] via family for concerns of acute cholecystitis vs pancreatitis. AST was 726, , Bilirubin elevated 2.2 with indirect bilirubin 1.2, lipase 30191, WBC 11.5. CT abdomen and pelvis with contrast demonstrated dilation and wall thickening of the gallbladder and dilated common bile duct with fat stranding around the pancreas. She was transferred to [facility] for ERCP and GS eval. Underwent EUS 1/19/2022 which demonstrated a 10 mm by 12 mm mass in the tail of the pancreas. Oncology was consulted. Spoke with patient at bedside. She does have baseline dementia so history difficult to obtain. However at this time denies any symptoms. Denies family history of cancer. Past Medical History:   has a past medical history of Essential hypertension, Hyperlipidemia, and Pre-diabetes. Past Surgical History:   has a past surgical history that includes Appendectomy (N/A); katia and bso (cervix removed) (N/A); Colonoscopy (N/A);  Pacemaker insertion (N/A, 12/30/2015); pacemaker placement; Upper gastrointestinal endoscopy (01/19/2022); Upper gastrointestinal endoscopy (N/A, 1/19/2022); and Upper gastrointestinal endoscopy (N/A, 1/19/2022). Medications:    Prior to Admission medications    Medication Sig Start Date End Date Taking?  Authorizing Provider   losartan (COZAAR) 100 MG tablet Take 100 mg by mouth daily   Yes Historical Provider, MD   pantoprazole (PROTONIX) 40 MG tablet Take 40 mg by mouth daily   Yes Historical Provider, MD   atorvastatin (LIPITOR) 40 MG tablet Take 40 mg by mouth daily    Historical Provider, MD     Current Facility-Administered Medications   Medication Dose Route Frequency Provider Last Rate Last Admin    morphine injection 2 mg  2 mg IntraVENous Once Vicky Casas MD        sodium chloride flush 0.9 % injection 5-40 mL  5-40 mL IntraVENous 2 times per day Vicky Casas MD   10 mL at 01/20/22 0941    sodium chloride flush 0.9 % injection 5-40 mL  5-40 mL IntraVENous PRN Vicky Casas MD        0.9 % sodium chloride infusion  25 mL IntraVENous PRN Vicky Casas MD        potassium chloride 10 mEq/100 mL IVPB (Peripheral Line)  10 mEq IntraVENous PRN Vicky Casas MD        magnesium sulfate 1000 mg in dextrose 5% 100 mL IVPB  1,000 mg IntraVENous PRN Vicky Casas MD        acetaminophen (TYLENOL) tablet 650 mg  650 mg Oral Q4H PRN Vciky Casas MD        HYDROcodone-acetaminophen (NORCO) 5-325 MG per tablet 1 tablet  1 tablet Oral Q4H PRN Vicky Casas MD        Or    HYDROcodone-acetaminophen (NORCO) 5-325 MG per tablet 2 tablet  2 tablet Oral Q4H PRN Vicky Casas MD        HYDROmorphone (DILAUDID) injection 0.25 mg  0.25 mg IntraVENous Q3H PRN Vicky Casas MD        Or    HYDROmorphone (DILAUDID) injection 0.5 mg  0.5 mg IntraVENous Q3H PRN Vicky Casas MD        ondansetron (ZOFRAN-ODT) disintegrating tablet 4 mg  4 mg Oral Q8H PRN Vicky Casas MD        Or    ondansetron (ZOFRAN) injection 4 mg  4 mg IntraVENous Q6H PRN Melanie Flores MD        [Held by provider] enoxaparin (LOVENOX) injection 40 mg  40 mg SubCUTAneous Daily Melanie Flores MD        atorvastatin (LIPITOR) tablet 40 mg  40 mg Oral Nightly Melanie Flores MD   40 mg at 01/18/22 2031    [Held by provider] losartan (COZAAR) tablet 100 mg  100 mg Oral Daily Melanie Flores MD        pantoprazole (PROTONIX) tablet 40 mg  40 mg Oral QAM AC Melanie Flores MD   40 mg at 01/20/22 0941    midodrine (PROAMATINE) tablet 2.5 mg  2.5 mg Oral TID WC Savannah Lambert MD   2.5 mg at 01/20/22 0941    piperacillin-tazobactam (ZOSYN) 3,375 mg in dextrose 5 % 50 mL IVPB extended infusion (mini-bag)  3,375 mg IntraVENous Q8H Melanie Flores MD   Stopped at 01/21/22 8130       Allergies:  Adhesive tape    Social History:   reports that she has never smoked. She has never used smokeless tobacco. She reports that she does not drink alcohol and does not use drugs. Family History: family history includes No Known Problems in her father and mother. Review of Symptoms:  Difficult to assess as patient has dementia. Denies any issues. Constitutional: Fatigue. No fever or chills. No night sweats, no weight loss   Eyes: No eye discharge, double vision, or eye pain   HEENT: negative for sore mouth, sore throat, hoarseness and voice change   Respiratory: negative for cough , sputum, dyspnea, wheezing, hemoptysis, chest pain   Cardiovascular: negative for chest pain, dyspnea, palpitations, orthopnea, PND   Gastrointestinal: negative for nausea, vomiting, diarrhea, constipation, abdominal pain, Dysphagia, hematemesis and hematochezia   Genitourinary: negative for frequency, dysuria, nocturia, urinary incontinence, and hematuria   Integument: negative for rash, skin lesions, bruises.    Hematologic/Lymphatic: negative for easy bruising, bleeding, lymphadenopathy, or petechiae   Endocrine: negative for heat or cold intolerance,weight changes, change in bowel habits and hair loss   Musculoskeletal: negative for myalgias, arthralgias, pain, joint swelling,and bone pain   Neurological: negative for headaches, dizziness, seizures, weakness, numbness    PHYSICAL EXAM:      BP (!) 146/80   Pulse 66   Temp 97.5 °F (36.4 °C) (Axillary)   Resp 16   Ht 5' 4\" (1.626 m)   Wt 160 lb (72.6 kg)   SpO2 98%   BMI 27.46 kg/m²    Temp (24hrs), Av.3 °F (36.3 °C), Min:97 °F (36.1 °C), Max:97.5 °F (36.4 °C)      General appearance - well appearing, no in pain or distress   Mental status - alert and cooperative   Eyes - pupils equal and reactive, extraocular eye movements intact   Ears - bilateral TM's and external ear canals normal   Mouth - mucous membranes moist, pharynx normal without lesions   Neck - supple, no significant adenopathy   Lymphatics - no palpable lymphadenopathy, no hepatosplenomegaly   Chest - clear to auscultation, no wheezes, rales or rhonchi, symmetric air entry   Heart - normal rate, regular rhythm, normal S1, S2, no murmurs  Abdomen - soft, nontender, nondistended, no masses or organomegaly   Neurological - alert, oriented, normal speech, pleasantly confused.     Musculoskeletal - no joint tenderness, deformity or swelling   Extremities - peripheral pulses normal, no pedal edema, no clubbing or cyanosis   Skin - normal coloration and turgor, no rashes, no suspicious skin lesions noted ,    Labs:   Complete Blood Count:   Recent Labs     22  0453   WBC 11.7*   HGB 11.1*   MCV 94.2      RBC 3.65*   HCT 34.4*   MCH 30.4   MCHC 32.3   RDW 16.4*   MPV 11.6      PT/INR:    Lab Results   Component Value Date    PROTIME 12.3 2022    INR 1.2 2022     PTT:  No results found for: APTT, PTT    Basal Metabolic Profile:   Recent Labs     22  0453 22  0419 22  2244    136 136   K 3.8 4.7 4.5   BUN 20 26* 33*   CREATININE 0.73 0.77 0.76    106 107   CO2 22 17* 20      LFTs  Recent Labs     22  0453 22  0419 22  2244 ALKPHOS 189* 201* 148*   * 403* 268*   * 146* 74*   BILITOT 2.19* 1.08 0.66   LABALBU 2.6* 2.5* 2.5*       Imaging:  NM HEPATOBILIARY    Result Date: 1/19/2022  No excretion of contrast into the extrahepatic biliary tree or small bowel at 2 hours. Gallbladder is small bowel or visualized at 18 hours, delayed visualization compatible with chronic cholecystitis. RECOMMENDATIONS: Unavailable     US GALLBLADDER RUQ    Result Date: 1/18/2022  1. Cholelithiases with significant gallbladder wall edema can be seen in the setting of pancreatitis, hepatitis or acute cholecystitis. 2. Trace amount of fluid in Man's pouch. Impression:   Primary Problem  Ascending cholangitis  Active Hospital Problems    Diagnosis Date Noted    Pancreatic mass [K86.89]     Chronic cholecystitis [K81.1]     Cholecystitis [K81.9] 01/18/2022    Gallstone pancreatitis [K85.10] 01/18/2022    Essential hypertension [I10] 01/18/2022    Sepsis ([address] [A41.9] 01/18/2022    Ascending cholangitis [K83.09] 01/18/2022    Acute pancreatitis [K85.90]      Assessment and Plan:  1. 10 mm by 12 mm mass in the tail of the Pancreas on EUS concerning for malignancy. 2. Ascending Cholangitis. 3. Acute Pancreatitis. I personally reviewed results of lab work-up imaging studies and other relevant clinical data. Reviewed previous medical records  Reviewed images of CT scan - CT chest outside facility showed para-aortic and enlarged iliac lymph nodes. Reviewed recommendation from other teams  Further treatment recommendations will depend upon path report and diagnosis. Upon discussion with patient and family, patient is not interested in surgery. Once diagnosis confirmed and no evidence of metastatic disease we may consider radiation with definitive intention. Will continue to follow     Discussed with patient and Nurse. Thank you for asking us to see this patient.     Karen Henry, DO  PGY-3, Internal medicine resident  [facility] [place], [place]  1/21/2022 9:18 AM       Attending Physician Statement  The patient was seen and examined during rounds, I have discussed the care of Kenneth Childress, including pertinent history and exam findings with the resident. I have reviewed the key elements of all parts of the encounter with the resident. I agree with the assessment, and status of the problem list as documented. Additional assessment/ plan    We will schedule outpatient CT PET for staging  We will set up outpatient follow-up appointment  Discussed with patient and family members. They expressed understanding of the plan. Electronically signed by   Nroeen Madsen MD    on 1/21/2022 at 5:25 PM                This note is created with the assistance of a speech recognition program.  While intending to generate a document that actually reflects the content of the visit, the document can still have some errors including those of syntax and sound a like substitutions which may escape proof reading. It such instances, actual meaning can be extrapolated by contextual diversion.

## 2022-01-21 NOTE — CARE COORDINATION
Patient/family seen: Yes, spoke with patient's daughter Lalitha Osman via phone call as patient and her spouse have Dementia       Informed patient/family of [facility]-A Medical Bundle Program with potential outreach by either Care Transitions Team or navealth Team based on hospital admission and location. BPCI-A Notification Letter given: Yes, no questions verbalized.   Letter with patient's d/c paperwork         Current discharge plan: Home with family, HC, IV antibiotics

## 2022-01-21 NOTE — DISCHARGE SUMMARY
[facility]  Office: [address], DO, Sriram Miranda, DO, Luh Redman, DO, Rebekah Anguiano Blood, DO, Beckie Russell MD, Jenni Edwards MD, Giles Broussard MD, Angelika Peng MD, Bonnie Hodgkin, MD, Opal Lazaro MD, Edmundo Mcmahon MD, Chloe Box, DO, Vahe Leyva, DO, James Herrera MD,  Faith Garcia, DO, Tierra Jarrett MD, Piter Hernandez MD, Mariah Dorado MD, Jamarcus Hansen MD, Mervat Jason MD, Tomas Berman MD, Keagan Dial MD, Fran Langley [facility], [facility], CNP, Chandler Anderson, CNP, Jose Alvarez, CNS, Lacy Banks, CNP, Mckay Wilkins, CNP, Jaimee Riley, CNP, Ale Martin, CNP, Zack Shannon CNP, Radonna Aschoff, PA-ROQUE, Iliana Ignacio [facility], Wilfrid Schaefer, [facility], Dmitriy Galdamez, CNP, Rafa Sy, CNP, Jesus Mercado, CNP, Ning Browne, CNP, Indigo Herrera, CNP, Barrie Cowart, [facility]    Discharge Summary     Patient ID: Amanda Peck  :  1931   MRN: [id number]     ACCOUNT:  [de-identified]   Patient's PCP: Tavo Jones DO  Admit Date: 2022   Discharge Date: 2022     Length of Stay: 3  Code Status:  Full Code  Admitting Physician: Salo Monreal DO  Discharge Physician: Salo Monreal DO     Active Discharge Diagnoses:     Hospital Problem Lists:  Principal Problem:    Ascending cholangitis  Active Problems:    Cholecystitis    Gallstone pancreatitis    Essential hypertension    Sepsis ([facility] [address]    Acute pancreatitis    Pancreatic mass    Chronic cholecystitis  Resolved Problems:    * No resolved hospital problems. *      Admission Condition:  serious     Discharged Condition: stable    Hospital Stay:     Hospital Course: Amanda Peck is a 80y.o. year-old female who initially presented to hospital with right upper quadrant abdominal pain. AST 1139, , alk phos 185, bilirubin 2.92.   Ultrasound of the gallbladder showed cholelithiasis with significant gallbladder wall edema.  HIDA scan showed findings consistent with chronic cholecystitis. General surgery was consulted, they did not recommend any surgical intervention at this time due to patient's age rapid resolution of symptoms. Patient was offered a cholecystostomy tube, however after discussion between IR, infectious disease, oncology, decision was made to hold off on cholecystostomy tube placement. Patient was treated for ascending cholangitis. ERCP did not show any stone in the common bile duct but did show gallbladder sludge and wall thickening. It was believed that stone may have passed by the time ERCP was done. Patient will be discharged on Zosyn for total of 14 days. Otherwise, patient underwent EUS on 1/19/2022 which showed: a 10 mm by 12 mm mass in pancreatic tail, preliminary cytology suspicious for malignancy. She was seen by oncology, recommended CT to evaluate for metastatic disease. Once diagnosis confirmed, the will decide between palliative radiation or palliative care/hospice depending on patient's goals. Patient will need to follow-up with Dr. Rosie Patel as an outpatient for definitive workup. Significant therapeutic interventions:   -Was treated with ertapenem for ascending cholangitis  -Was seen by general surgery who did not recommend any surgical intervention  -Was seen by GI for ERCP, stone had passed by the time evaluation. A pancreatic mass was found in the tail of the pancreas.  -She was seen by oncology who recommended outpatient evaluation pending final pathology report.   -May need palliative radiation outpatient      Significant Diagnostic Studies:   Labs / Micro:  CBC:   Lab Results   Component Value Date    WBC 11.7 01/19/2022    RBC 3.65 01/19/2022    HGB 11.1 01/19/2022    HCT 34.4 01/19/2022    MCV 94.2 01/19/2022    MCH 30.4 01/19/2022    MCHC 32.3 01/19/2022    RDW 16.4 01/19/2022     01/19/2022     BMP:    Lab Results   Component Value Date    GLUCOSE 152 01/20/2022    NA 136 01/20/2022    K 4.5 01/20/2022     01/20/2022    CO2 20 01/20/2022    ANIONGAP 9 01/20/2022    BUN 33 01/20/2022    CREATININE 0.76 01/20/2022    BUNCRER NOT REPORTED 01/20/2022    CALCIUM 8.0 01/20/2022    LABGLOM >60 01/20/2022    GFRAA >60 01/20/2022    GFR      01/20/2022    GFR NOT REPORTED 01/20/2022     HFP:    Lab Results   Component Value Date    PROT 5.2 01/20/2022        Radiology:  NM HEPATOBILIARY    Result Date: 1/19/2022  No excretion of contrast into the extrahepatic biliary tree or small bowel at 2 hours. Gallbladder is small bowel or visualized at 18 hours, delayed visualization compatible with chronic cholecystitis. RECOMMENDATIONS: Unavailable     US GALLBLADDER RUQ    Result Date: 1/18/2022  1. Cholelithiases with significant gallbladder wall edema can be seen in the setting of pancreatitis, hepatitis or acute cholecystitis. 2. Trace amount of fluid in Man's pouch. Consultations:    Consults:     Final Specialist Recommendations/Findings:   IP CONSULT TO GENERAL SURGERY  IP CONSULT TO HOSPITALIST  IP CONSULT TO GI  IP CONSULT TO INFECTIOUS DISEASES  IP CONSULT TO IV TEAM  IP CONSULT TO ONCOLOGY  IP CONSULT TO HOME CARE NEEDS      The patient was seen and examined on day of discharge and this discharge summary is in conjunction with any daily progress note from day of discharge. Discharge plan:     Disposition: Home    Physician Follow Up:     [address] [facility] 1 [facility] specialty infusion  [address] [facility] [address]  [address] [facility] [address]  [address] [facility] [address]  [phone]    Schedule an appointment as soon as possible for a visit  Hospital follow up    Giuliana Renteria MD  [address] [place] [address],  [address] [address], [address] [facility] [phone]    In 2 weeks  Follow up for ascending cholangitis    Argelia Yuen MD  [address] [place] [id number]  [phone]    Schedule an Specialty: Hematology and Oncology   [facility] Visits Requested: [facility]   Referral Priority: Routine Referral Type: Eval and Treat   Referral Reason: Specialty Services Required   Requested Specialty: General Surgery   Number of Visits Requested: 1       Time Spent on discharge is  40 mins in patient examination, evaluation, counseling as well as medication reconciliation, prescriptions for required medications, discharge plan and follow up. Electronically signed by   Netta Castillo DO  1/21/2022  3:09 PM      Thank you Dr. Rhea Fuller DO for the opportunity to be involved in this patient's care.

## 2022-01-21 NOTE — PLAN OF CARE
Problem: Falls - Risk of:  Goal: Will remain free from falls  Description: Will remain free from falls  1/21/2022 1651 by Rufus Espinosa RN  Outcome: Completed  1/21/2022 0518 by Nancie Sherman  Outcome: Ongoing  Goal: Absence of physical injury  Description: Absence of physical injury  1/21/2022 1651 by Rufus Espinosa RN  Outcome: Completed  1/21/2022 0518 by Nancie Sherman  Outcome: Ongoing     Problem: Activity:  Goal: Risk for activity intolerance will decrease  Description: Risk for activity intolerance will decrease  1/21/2022 1651 by Rufus Espinosa RN  Outcome: Completed  1/21/2022 0518 by Nancie Sherman  Outcome: Ongoing     Problem:  Bowel/Gastric:  Goal: Bowel function will improve  Description: Bowel function will improve  1/21/2022 1651 by Rufus Espinosa RN  Outcome: Completed  1/21/2022 0518 by Nancie Sherman  Outcome: Ongoing  Goal: Diagnostic test results will improve  Description: Diagnostic test results will improve  1/21/2022 1651 by Rufus Espinosa RN  Outcome: Completed  1/21/2022 0518 by Nancie Sherman  Outcome: Ongoing  Goal: Occurrences of nausea will decrease  Description: Occurrences of nausea will decrease  1/21/2022 1651 by Rufus Espinosa RN  Outcome: Completed  1/21/2022 0518 by Nancie Sherman  Outcome: Ongoing  Goal: Occurrences of vomiting will decrease  Description: Occurrences of vomiting will decrease  1/21/2022 1651 by Rufus Espinosa RN  Outcome: Completed  1/21/2022 0518 by Nancie Sherman  Outcome: Ongoing     Problem: Fluid Volume:  Goal: Maintenance of adequate hydration will improve  Description: Maintenance of adequate hydration will improve  1/21/2022 1651 by Rufus Espinosa RN  Outcome: Completed  1/21/2022 0518 by Nancie Sherman  Outcome: Ongoing     Problem: Health Behavior:  Goal: Ability to state signs and symptoms to report to health care provider will improve  Description: Ability to state signs and symptoms to report to health care provider will improve  1/21/2022 1651 by Melodie Boeck, RN  Outcome: Completed  1/21/2022 0518 by Luda Lomax  Outcome: Ongoing     Problem: Physical Regulation:  Goal: Complications related to the disease process, condition or treatment will be avoided or minimized  Description: Complications related to the disease process, condition or treatment will be avoided or minimized  1/21/2022 1651 by Melodie Boeck, RN  Outcome: Completed  1/21/2022 0518 by Luda Lomax  Outcome: Ongoing  Goal: Ability to maintain clinical measurements within normal limits will improve  Description: Ability to maintain clinical measurements within normal limits will improve  1/21/2022 1651 by Melodie Boeck, RN  Outcome: Completed  1/21/2022 0518 by Luda Lomax  Outcome: Ongoing     Problem: Sensory:  Goal: Ability to identify factors that increase the pain will improve  Description: Ability to identify factors that increase the pain will improve  1/21/2022 1651 by Melodie Boeck, RN  Outcome: Completed  1/21/2022 0518 by Luda Lomax  Outcome: Ongoing  Goal: Ability to notify healthcare provider of pain before it becomes unmanageable or unbearable will improve  Description: Ability to notify healthcare provider of pain before it becomes unmanageable or unbearable will improve  1/21/2022 1651 by Melodie Boeck, RN  Outcome: Completed  1/21/2022 0518 by Luda Lomax  Outcome: Ongoing  Goal: Pain level will decrease  Description: Pain level will decrease  1/21/2022 1651 by Melodie Boeck, RN  Outcome: Completed  1/21/2022 0518 by Luda Lomax  Outcome: Ongoing     Problem: HEMODYNAMIC STATUS  Goal: Hemodynamic status to baseline/discharge criteria met  1/21/2022 1651 by Melodie Boeck, RN  Outcome: Completed  1/21/2022 0518 by Luda Lomax  Outcome: Ongoing     Problem: NUTRITION  Goal: Patient to baseline / improved nutrition  1/21/2022 1651 by Melodie Boeck, RN  Outcome: Completed  1/21/2022 0518 by [place] Rater  Outcome: Ongoing     Problem: LAB & DIAGNOSTICS  Goal: Additional tests per physician orders  1/21/2022 1651 by Td Wren RN  Outcome: Completed  1/21/2022 0518 by [place] Rater  Outcome: Ongoing     Problem: RESPIRATORY  Goal: SaO2 sat,airway patentcy, cough mechanism maintained  1/21/2022 1651 by Td Wren, RN  Outcome: Completed  1/21/2022 0518 by Cristina Rater  Outcome: Ongoing     Problem: PAIN MANAGEMENT  Goal: Patient return to pre procedure comfort  1/21/2022 1651 by Td Wren, RN  Outcome: Completed  1/21/2022 0518 by Cristina Rater  Outcome: Ongoing     Problem: Musculor/Skeletal Functional Status  Goal: Highest potential functional level  1/21/2022 1651 by Td Wren RN  Outcome: Completed  1/21/2022 0518 by [place] Rater  Outcome: Ongoing     Problem: Pain:  Goal: Pain level will decrease  Description: Pain level will decrease  1/21/2022 1651 by Td Wren RN  Outcome: Completed  1/21/2022 0518 by Cristina Rater  Outcome: Ongoing  Goal: Control of acute pain  Description: Control of acute pain  1/21/2022 1651 by Td Wren RN  Outcome: Completed  1/21/2022 0518 by [place] Rater  Outcome: Ongoing  Goal: Control of chronic pain  Description: Control of chronic pain  1/21/2022 1651 by Td Wren RN  Outcome: Completed  1/21/2022 0518 by Cristina Rater  Outcome: Ongoing

## 2022-01-21 NOTE — PROGRESS NOTES
[facility] -   Infectious diseases evaluation  admission date 1/17/2022    reason for consultation:   Ascending cholangitis    Impression :   Current:  · Ascending cholangitis   · Acute Cholecystitis w biliary obstruction  · Pancreatitis - likely gallstone   · Pancreatic tail tumor, suspicious for neoplasia   · Leukocytosis     Other:  ·   Discussion / summary of stay / plan of care   ·   Recommendations     · On zosyn   · Reconciled to Invanz to facilitate DC- 2 weeks  ·  HIDA - acute kathi and biliary obst picture  · EUS ERCP 1/19 - + biopsy of the tail of the pancreas for carcinoma  · Defer  cholecystostomy due to poor outcome w the  Pancreatic cancer  · Plan for midline,      Infection Control Recommendations   · [place] Precautions      Antimicrobial Stewardship Recommendations   · Simplification of therapy  · Targeted therapy    Coordination ofOutpatient Care:   · Estimated Length of IV antimicrobials:  · Patient will need Midline / picc Catheter Insertion:   · Patient will need SNF:  · Patient will need outpatient wound care:     History of Present Illness:   Initial history:  Tasia Ludwig is a 80y.o.-year-old female who presented to the ED from [facility] for concerns of acute cholecystitis vs pancreatitis. She was having abd pain and nausea/vomiting with  and , elevated total bilirubin of 2.2, indirect bilirubin 1.2, lipase 12273, and WBC 11.5. CT abd pelvis w/ contrast showed dilation and wall thickening of the gallbladder and a dilated common bile duct with fat stranding around the pancreas. She was transferred here to see GI for possible ERCP and GS evaluation. On my exam ,after the HIDA. Pleasant confused and no abd pain or tenderness - no nausea or vomiting at this time  Not in distress  No cellultiis    1/20: Remains afebrile.  No abd pain and feels ok  HIDA showed acute cholecystitis with biliary obstruction   GI -EUS ERCP Surgical History:   Procedure Laterality Date    APPENDECTOMY N/A     COLONOSCOPY N/A     PACEMAKER INSERTION N/A 12/30/2015    PACEMAKER PLACEMENT      [employer] pacemaker, NOT MRI SAFE per [employer]- krm    KIRSTEN AND BSO N/A     UPPER GASTROINTESTINAL ENDOSCOPY  01/19/2022    w/EUS FNA    UPPER GASTROINTESTINAL ENDOSCOPY N/A 1/19/2022    EGD DIAGNOSTIC ONLY performed by Jessica Gallardo MD at [address] [place] [address] N/A 1/19/2022    EGD W/EUS FNA performed by Jessica Gallardo MD at [facility] OR       Medications:      morphine  2 mg IntraVENous Once    sodium chloride flush  5-40 mL IntraVENous 2 times per day    [Held by provider] enoxaparin  40 mg SubCUTAneous Daily    atorvastatin  40 mg Oral Nightly    [Held by provider] losartan  100 mg Oral Daily    pantoprazole  40 mg Oral QAM AC    midodrine  2.5 mg Oral TID WC    piperacillin-tazobactam  3,375 mg IntraVENous Q8H       Social History:     Social History     Socioeconomic History    Marital status:      Spouse name: Not on file    Number of children: Not on file    Years of education: Not on file    Highest education level: Not on file   Occupational History    Not on file   Tobacco Use    Smoking status: Never Smoker    Smokeless tobacco: Never Used   Substance and Sexual Activity    Alcohol use: Never    Drug use: Never    Sexual activity: Not on file   Other Topics Concern    Not on file   Social History Narrative    Not on file     Social Determinants of Health     Financial Resource Strain:     Difficulty of Paying Living Expenses: Not on file   Food Insecurity:     Worried About Running Out of Food in the Last Year: Not on file    Fred of Food in the Last Year: Not on file   Transportation Needs:     Lack of Transportation (Medical): Not on file    Lack of Transportation (Non-Medical):  Not on file   Physical Activity:     Days of Exercise per Week: Not on file    Minutes of Exercise per Session: Not on file   Stress:     Feeling of Stress : Not on file   Social Connections:     Frequency of Communication with Friends and Family: Not on file    Frequency of Social Gatherings with Friends and Family: Not on file    Attends Rastafarian Services: Not on file    Active Member of Clubs or Organizations: Not on file    Attends Club or Organization Meetings: Not on file    Marital Status: Not on file   Intimate Partner Violence:     Fear of Current or Ex-Partner: Not on file    Emotionally Abused: Not on file    Physically Abused: Not on file    Sexually Abused: Not on file   Housing Stability:     Unable to Pay for Housing in the Last Year: Not on file    Number of [place] in the Last Year: Not on file    Unstable Housing in the Last Year: Not on file       Family History:     Family History   Problem Relation Age of Onset    No Known Problems Mother     No Known Problems Father         Allergies:   Adhesive tape     Review of Systems:     Review of Systems   Constitutional: Negative for activity change, chills, diaphoresis, fatigue and fever. HENT: Negative for congestion and rhinorrhea. Eyes: Negative for discharge, redness, itching and visual disturbance. Respiratory: Negative for cough and shortness of breath. Cardiovascular: Negative for chest pain and leg swelling. Gastrointestinal: Negative for abdominal pain, nausea and vomiting. Endocrine: Negative for heat intolerance and polyphagia. Genitourinary: Negative for dysuria and frequency. Musculoskeletal: Negative for arthralgias and myalgias. Skin: Negative for color change and rash. Allergic/Immunologic: Negative for immunocompromised state. Neurological: Negative for dizziness, light-headedness and headaches. Hematological: Negative for adenopathy. Psychiatric/Behavioral: Positive for confusion. Negative for agitation and sleep disturbance. The patient is not nervous/anxious. Physical Examination :     Patient Vitals for the past 8 hrs:   BP Temp Temp src Pulse Resp SpO2 Weight   01/21/22 0733 (!) 146/80 97.5 °F (36.4 °C) Axillary 66 16 98 %    01/21/22 0600       160 lb (72.6 kg)   01/21/22 0445 (!) 155/82 97.5 °F (36.4 °C) Axillary 65 16 97 %        Physical Exam  Constitutional:       Appearance: She is normal weight. She is not ill-appearing. HENT:      Head: Normocephalic and atraumatic. Nose: Nose normal.      Mouth/Throat:      Mouth: Mucous membranes are moist.      Pharynx: Oropharynx is clear. Eyes:      General: No scleral icterus. Extraocular Movements: Extraocular movements intact. Conjunctiva/sclera: Conjunctivae normal.   Cardiovascular:      Rate and Rhythm: Normal rate and regular rhythm. Pulses: Normal pulses. Heart sounds: Normal heart sounds. Pulmonary:      Effort: Pulmonary effort is normal. No respiratory distress. Breath sounds: Normal breath sounds. Abdominal:      General: Abdomen is flat. Palpations: Abdomen is soft. Tenderness: There is no abdominal tenderness. There is no guarding. Genitourinary:     Comments: No proctor  No CVA tenderness  Musculoskeletal:         General: No swelling or tenderness. Normal range of motion. Cervical back: Normal range of motion. No rigidity or tenderness. Right lower leg: No edema. Left lower leg: No edema. Skin:     General: Skin is warm and dry. Capillary Refill: Capillary refill takes less than 2 seconds. Neurological:      Mental Status: She is alert. Mental status is at baseline. She is disoriented. Cranial Nerves: No cranial nerve deficit. Sensory: No sensory deficit. Coordination: Coordination normal.   Psychiatric:         Behavior: Behavior normal.         Thought Content:  Thought content normal.           Medical Decision Making:   I have independently reviewed/ordered the following labs:    CBC with Differential: Recent Labs     01/19/22  0453   WBC 11.7*   HGB 11.1*   HCT 34.4*      LYMPHOPCT 16*   MONOPCT 6     BMP:  Recent Labs     01/20/22  0419 01/20/22  2244    136   K 4.7 4.5    107   CO2 17* 20   BUN 26* 33*   CREATININE 0.77 0.76     Hepatic Function Panel:   Recent Labs     01/20/22  0419 01/20/22  2244   PROT 5.8* 5.2*   LABALBU 2.5* 2.5*   BILITOT 1.08 0.66   ALKPHOS 201* 148*   * 268*   * 74*     No results for input(s): RPR in the last 72 hours. No results for input(s): HIV in the last 72 hours. No results for input(s): BC in the last 72 hours. Lab Results   Component Value Date    CREATININE 0.76 01/20/2022    GLUCOSE 152 01/20/2022     Thank you for allowing us to participate in the care of this patient. Please call with questions. This note is created with the assistance of a speech recognition program.  While intending to generate adocument that actually reflects the content of the visit, the document can still have some errors including those of syntax and sound a like substitutions which may escape proof reading. It such instances, actual meaningcan be extrapolated by contextual diversion. Lemuel Lopez, OMS-IV       I have discussed the care of the patient, including pertinent history and exam findings,  with the resident. I have seen and examined the patient and the key elements of all parts of the encounter have been performed by me. I agree with the assessment, plan and orders as documented by the resident.     Lana Garibay, Infectious Diseases

## 2022-01-21 NOTE — PLAN OF CARE
Disc w Dr Patricia Escobar and Dr Daya Do - the pancreatic mass  is likely a neoplasia, and she is doing clinically better - she might not be a candidate for cholecystectomy in few weeks-  Will defer the cholecystostomy and if pancreatic cancer diag is final, would ask for hospice eval  Plan AB 2 weeks in a midline - stacy Plascencia MD. Infectious Diseases

## 2022-01-21 NOTE — CARE COORDINATION
[facility] Flow/Interdisciplinary Rounds Progress Note    Quality Flow Rounds held on January 21, 2022 at [address] [facility] Attending:  Bedside Nurse, ,  and Nursing Unit Leadership    Barriers to Discharge:     Anticipated Discharge Date:       Anticipated Discharge Disposition: Home with [facility] and IV Antibiotics    Readmission Risk              Risk of Unplanned Readmission:  14           Discussed patient goal for the day, patient clinical progression, and barriers to discharge. The following Goal(s) of the Day/Commitment(s) have been identified:  Activity Progression  Transitional Care Plan, Discharge - Obtain Order and IV Antibiotics - Obtain Infectious Disease Discharge Orders    Pt to be d/c home today. Midline placed for IV antibiotics (patient to be on Zosyn x 2 weeks). Referral placed to [facility], all documentation faxed to them PS Dr Megan Delgadillo requesting signed prescription so it can be faxed/called to [facility] by 1500. ASHOK to be completed by Attending    [phone] with Dr Megan Delgadillo, patient will be d/c home on Invanz, 1000mg IV every 24 hours for 12 days. First dose will be given prior to patient d/c today. Prescription called in to [facility], pharmacist with [facility] ([phone] ). Discussed with RN. Patient's family will be in later for discharge. Contacted patient's daughter Isabelle Flannery ([phone]), discussed Transitional Care Plan, notified her that patient is being d/c home on IV antibiotics. Isabelle Flannery confirms that she or her brother will be there to learn how to administer IV antibiotic. Sierra with [facility] and Jacqlyn Closs with [facility] notified of same.   IMM and BPCI reviewed with patient's daughter Waylon Agarwal RN  January 21, 2022

## 2022-01-23 LAB
CULTURE: NORMAL
CULTURE: NORMAL
Lab: NORMAL
Lab: NORMAL
SPECIMEN DESCRIPTION: NORMAL
SPECIMEN DESCRIPTION: NORMAL

## 2022-01-24 ENCOUNTER — CARE COORDINATION (OUTPATIENT)
Dept: CASE MANAGEMENT | Age: 87
End: 2022-01-24

## 2022-01-24 ENCOUNTER — TELEPHONE (OUTPATIENT)
Dept: ONCOLOGY | Age: 87
End: 2022-01-24

## 2022-01-24 LAB — INTERVENTION: NORMAL

## 2022-01-24 NOTE — CARE COORDINATION
[facility] Transitions Initial Follow Up Call    Call within 2 business days of discharge: Yes    Patient: Joanie Marie Patient : [id number]   MRN: <[id number]>  Reason for Admission: acute pancreatitis  Discharge Date: 22 RARS: Readmission Risk Score: 13 ( )      Last Discharge [facility]       Complaint Diagnosis Description Type Department Provider    22 Abdominal Pain Acute pancreatitis, unspecified complication status, unspecified pancreatitis type . .. ED to Hosp-Admission (Discharged) (ADMITTED) [address] [facility]; [place] . .. Spoke with: 24 HOUR INITIAL CALL. Spoke to son Karla Smith. Pt has dementia and is unable to speak on the phone. Karla Smith states Christine Hernandez denies abdominal  pain. She Is eating fair. No N/V/D noted at this time. She has IV access to her right arm. Pt. Gets Invanz infused daily. Medication reconciliation completed. Non [facility] PCP. Takes medications as prescribed. Normal elimination patterns. Pt ambulates without assist. Discussed f/u visits with PCP and specialists. Karla Smith states he is unsure of appts. Scheduled. Call to daughter Sorin Grimm to update on needed appts. Left message. Christine Hernandez has not had her Covid Vaccine. Call to [facility] to verify [facility]. Soc was completed on 22. No new issues or concerns. No questions at this time. Patient Nika Underwood is aware of when to contact MD with any new or worsening symptoms. Advised to contact PCP  with any health concerns for early outpatient intervention in an effort to avoid hospitalization. Report any worsening symptoms to PCP and/or Call 911 and/or GO TO EMERGENCY ROOM if symptoms are severe. Expresses understanding. Final call. Transitions of Care Initial Call    Was this an external facility discharge?  No Discharge Facility: n/a    Challenges to be reviewed by the provider   Additional needs identified to be addressed with provider: No  none             Method of communication with provider : none      Advance Care Planning:   Does patient have an Advance Directive: reviewed and current. Was this a readmission? No  Patient stated reason for admission: abdominal pain  Patients top risk factors for readmission: medical condition-abdominal pain  pancreatic mass    Care Transition Nurse (CTN) contacted the family by telephone to perform post hospital discharge assessment. Verified name and  with family as identifiers. Provided introduction to self, and explanation of the CTN role. CTN reviewed discharge instructions, medical action plan and red flags with family who verbalized understanding. Family given an opportunity to ask questions and does not have any further questions or concerns at this time. Were discharge instructions available to patient? Yes. Reviewed appropriate site of care based on symptoms and resources available to patient including: PCP, Specialist, Home health and When to call 911. The family agrees to contact the PCP office for questions related to their healthcare. Medication reconciliation was performed with family, who verbalizes understanding of administration of home medications. Advised obtaining a 90-day supply of all daily and as-needed medications. Covid Risk Education     Educated patient about risk for severe COVID-19 due to risk factors according to CDC guidelines. LPN CC reviewed discharge instructions, medical action plan and red flag symptoms with the family who verbalized understanding. Discussed COVID vaccination status: Yes. Education provided on COVID-19 vaccination as appropriate. Discussed exposure protocols and quarantine with CDC Guidelines. Family was given an opportunity to verbalize any questions and concerns and agrees to contact LPN CC or health care provider for questions related to their healthcare. Reviewed and educated family on any new and changed medications related to discharge diagnosis.      Was patient discharged with a pulse oximeter? No Discussed and confirmed pulse oximeter discharge instructions and when to notify provider or seek emergency care. LPN CC provided contact information. No further follow-up call indicated based on severity of symptoms and risk factors. Plan for next call: n/a        Facility: [address] [place] [address]    Non-face-to-face services provided:  Obtained and reviewed discharge summary and/or continuity of care documents  Communication with home health agencies or other community services the patient is currently using-[facility] [address] Transitions 24 Hour Call    Care Transitions Interventions         Follow Up  No future appointments.     MAXI Martinez LPN Care Transitions Nurse   [phone]

## 2022-01-24 NOTE — TELEPHONE ENCOUNTER
Procedure:  AMB REFERRAL TO HEMATOLOGY ONCOLOGY   Associated diagnosis: K86.89 (ICD-10-CM) - Pancreatic mass   Date: 1/21/2022   Provider: Netta Castillo DO   Department: [facility] 3B RENAL/MED SURG     Tried to contact patient for hospital f/u with Dr. Quinn Saavedra  Could not LVM

## 2022-01-24 NOTE — TELEPHONE ENCOUNTER
Writer called patient delilah Dhaliwal to introduce self as navigator and to obtain a VV per Dr. Daniel Espinosa orders. No answer, detailed VM left. Will await a return call.

## 2022-01-25 ENCOUNTER — TELEPHONE (OUTPATIENT)
Dept: ONCOLOGY | Age: 87
End: 2022-01-25

## 2022-01-25 NOTE — TELEPHONE ENCOUNTER
Pt son [employer] [facility] returned writers call. Writer introduced self as navigator and explained to him that Dr. Payton Cortez will do a VV for f/u. [employer] [facility] states he works Monday through Friday until 4:30pm, he says that he will find a family member to accompany his Mom on a VV and will call writer tomorrow. Pt son appreciative of assistance.  Will route note to Dr. Payton Cortez to update on above

## 2022-01-25 NOTE — TELEPHONE ENCOUNTER
Writer attempted to call pt son to introduce self as navigator and to obtain a VV per Dr. Cayetano Isabel. No answer, detailed VM left. Will await a return call.

## 2022-01-26 ENCOUNTER — TELEPHONE (OUTPATIENT)
Dept: ONCOLOGY | Age: 87
End: 2022-01-26

## 2022-01-26 NOTE — TELEPHONE ENCOUNTER
Writer received a call from pt son Heriberto Jama stating that is brother will be doing the VV with their [address] [place] [address] Lexa Danielson phone number is [phone]. Dr. Mya Lopez wants patient to be added to his schedule for this Friday at 2pm for VV. Writer called pt son Justina Irving and explained VV process and informed him of appt. Writer then spoke to Leoncio at [facility] who added pt to Dr. Gardenia Perez schedule. Writer provided Leoncio with Jagjit cell number. Pt son appreciative of assistance. Will continue to follow.

## 2022-01-28 ENCOUNTER — VIRTUAL VISIT (OUTPATIENT)
Dept: ONCOLOGY | Age: 87
End: 2022-01-28
Payer: MEDICARE

## 2022-01-28 DIAGNOSIS — R59.0 MEDIASTINAL LYMPHADENOPATHY: ICD-10-CM

## 2022-01-28 DIAGNOSIS — K83.09 ASCENDING CHOLANGITIS: ICD-10-CM

## 2022-01-28 DIAGNOSIS — C25.2 MALIGNANT NEOPLASM OF TAIL OF PANCREAS (HCC): ICD-10-CM

## 2022-01-28 DIAGNOSIS — K85.10 GALLSTONE PANCREATITIS: ICD-10-CM

## 2022-01-28 DIAGNOSIS — K81.9 CHOLECYSTITIS: ICD-10-CM

## 2022-01-28 DIAGNOSIS — C25.9 MALIGNANT NEOPLASM OF PANCREAS, UNSPECIFIED LOCATION OF MALIGNANCY (HCC): Primary | ICD-10-CM

## 2022-01-28 PROCEDURE — 99214 OFFICE O/P EST MOD 30 MIN: CPT | Performed by: INTERNAL MEDICINE

## 2022-01-28 PROCEDURE — G8427 DOCREV CUR MEDS BY ELIG CLIN: HCPCS | Performed by: INTERNAL MEDICINE

## 2022-01-28 PROCEDURE — 99211 OFF/OP EST MAY X REQ PHY/QHP: CPT | Performed by: INTERNAL MEDICINE

## 2022-01-28 PROCEDURE — 4040F PNEUMOC VAC/ADMIN/RCVD: CPT | Performed by: INTERNAL MEDICINE

## 2022-01-28 PROCEDURE — 1090F PRES/ABSN URINE INCON ASSESS: CPT | Performed by: INTERNAL MEDICINE

## 2022-01-28 PROCEDURE — 1111F DSCHRG MED/CURRENT MED MERGE: CPT | Performed by: INTERNAL MEDICINE

## 2022-01-28 PROCEDURE — 1123F ACP DISCUSS/DSCN MKR DOCD: CPT | Performed by: INTERNAL MEDICINE

## 2022-01-28 NOTE — PROGRESS NOTES
Jayce Cooper                                                                                                                  1/28/2022  MRN:   [id number]  YOB: 1931  PCP:                           Helga Berrios DO  Referring Physician: Jolanta Souza  Treating Physician Name: Greg Leung MD      Reason for visit:  Chief Complaint   Patient presents with    Pancreatic Cancer   Patient seen in clinic via virtual visit due to ongoing coronavirus pandemic    Current problems:  Poorly differentiated carcinoma, high nuclear grade, of pancreatic tail  Ascending cholangitis  Gallstone pancreatitis  Para-aortic lymphadenopathy per CT scan at outside hospital  Mediastinal soft tissue density    Active and recent treatments:  CT PET    Summary of Case/History:    Jayce Cooper a 80 y. o.female who is admitted to the hospital for ascending cholangitis. Per patient she experienced some abdominal pain, nausea, vomiting and fatigue and was taken to [facility] via family for concerns of acute cholecystitis vs pancreatitis. AST was 726, , Bilirubin elevated 2.2 with indirect bilirubin 1.2, lipase 66829, WBC 11.5. CT abdomen and pelvis with contrast demonstrated dilation and wall thickening of the gallbladder and dilated common bile duct with fat stranding around the pancreas. She was transferred to [facility] for ERCP and GS eval. Underwent EUS 1/19/2022 which demonstrated a 10 mm by 12 mm mass in the tail of the pancreas. Oncology was consulted.      Spoke with patient at bedside. She does have baseline dementia so history difficult to obtain. However at this time denies any symptoms. Denies family history of cancer. Interim History:    Patient seen in clinic via virtual visit due to ongoing coronavirus pandemic. Patient overall is doing well. She is on IV antibiotics which she will complete next week. Denies any fever chills. Denies any more abdominal pain. Appetite is improving. Denies ER visit or hospitalization since hospital discharge. During this visit patient's allergy, social, medical, surgical history and medications were reviewed and updated. Past Medical History:   Past Medical History:   Diagnosis Date    Essential hypertension     Hyperlipidemia     Pre-diabetes        Past Surgical History:     Past Surgical History:   Procedure Laterality Date    APPENDECTOMY N/A     COLONOSCOPY N/A     INSERT MIDLINE CATHETER  1/21/2022         PACEMAKER INSERTION N/A 12/30/2015    PACEMAKER PLACEMENT      [employer] pacemaker, NOT MRI SAFE per [employer]- krm    KIRSTEN AND BSO N/A     UPPER GASTROINTESTINAL ENDOSCOPY  01/19/2022    w/EUS FNA    UPPER GASTROINTESTINAL ENDOSCOPY N/A 1/19/2022    EGD DIAGNOSTIC ONLY performed by Jack Baker MD at [address] N/A 1/19/2022    EGD W/EUS FNA performed by Jack Baker MD at [place] [address]       Patient Family Social History:     Family History   Problem Relation Age of Onset    No Known Problems Mother     No Known Problems Father      Social History     Socioeconomic History    Marital status:      Spouse name: None    Number of children: None    Years of education: None    Highest education level: None   Occupational History    None   Tobacco Use    Smoking status: Never Smoker    Smokeless tobacco: Never Used   Substance and Sexual Activity    Alcohol use: Never    Drug use: Never    Sexual activity: None   Other Topics Concern    None   Social History Narrative    None     Social Determinants of Health     Financial Resource Strain:     Difficulty of Paying Living Expenses: Not on file   Food Insecurity:     Worried About Running Out of Food in the Last Year: Not on file    Fred of Food in the Last Year: Not on file   Transportation Needs:     Lack of Transportation (Medical):  Not on file    Lack of Transportation (Non-Medical): Not on file   Physical Activity:     Days of Exercise per Week: Not on file    Minutes of Exercise per Session: Not on file   Stress:     Feeling of Stress : Not on file   Social Connections:     Frequency of Communication with Friends and Family: Not on file    Frequency of Social Gatherings with Friends and Family: Not on file    Attends Spiritism Services: Not on file    Active Member of [address] [place] [address] or Organizations: Not on file    Attends Club or Organization Meetings: Not on file    Marital Status: Not on file   Intimate Partner Violence:     Fear of Current or Ex-Partner: Not on file    Emotionally Abused: Not on file    Physically Abused: Not on file    Sexually Abused: Not on file   Housing Stability:     Unable to Pay for Housing in the Last Year: Not on file    Number of [place] in the Last Year: Not on file    Unstable Housing in the Last Year: Not on file       Current Medications:     Current Outpatient Medications   Medication Sig Dispense Refill    ertapenem (INVANZ) infusion Infuse 1,000 mg intravenously every 24 hours for 12 days Compound per protocol. 12 g 0    atorvastatin (LIPITOR) 40 MG tablet Take 40 mg by mouth daily      losartan (COZAAR) 100 MG tablet Take 100 mg by mouth daily      pantoprazole (PROTONIX) 40 MG tablet Take 40 mg by mouth daily       No current facility-administered medications for this visit. Allergies:   Adhesive tape    Review of Systems:    Constitutional: No fever or chills.  No night sweats, no weight loss   Eyes: No eye discharge, double vision, or eye pain   HEENT: negative for sore mouth, sore throat, hoarseness and voice change   Respiratory: negative for cough , sputum, dyspnea, wheezing, hemoptysis, chest pain   Cardiovascular: negative for chest pain, dyspnea, palpitations, orthopnea, PND   Gastrointestinal: negative for nausea, vomiting, diarrhea, constipation, abdominal pain, Dysphagia, hematemesis and hematochezia Genitourinary: negative for frequency, dysuria, nocturia, urinary incontinence, and hematuria   Integument: negative for rash, skin lesions, bruises. Hematologic/Lymphatic: negative for easy bruising, bleeding, lymphadenopathy, or petechiae   Endocrine: negative for heat or cold intolerance,weight changes, change in bowel habits and hair loss   Musculoskeletal: negative for myalgias, arthralgias, pain, joint swelling,and bone pain   Neurological: negative for headaches, dizziness, seizures, weakness, numbness          PHYSICAL EXAMINATION:    Vital Signs: (As obtained by patient/caregiver or practitioner observation)    Blood pressure-  Heart rate-    Respiratory rate-    Temperature-  Pulse oximetry-     Constitutional: [x] Appears well-developed and well-nourished [x] No apparent distress      [] Abnormal-   Mental status  [x] Alert and awake  [x] Oriented to person/place/time [x]Able to follow commands      Eyes:  EOM    [x]  Normal  [] Abnormal-  Sclera  [x]  Normal  [] Abnormal -         Discharge [x]  None visible  [] Abnormal -    HENT:   [x] Normocephalic, atraumatic.   [] Abnormal   [x] Mouth/Throat: Mucous membranes are moist.     External Ears [x] Normal  [] Abnormal-     Neck: [x] No visualized mass     Pulmonary/Chest: [x] Respiratory effort normal.  [x] No visualized signs of difficulty breathing or respiratory distress        [] Abnormal-      Musculoskeletal:   [] Normal gait with no signs of ataxia         [x] Normal range of motion of neck        [] Abnormal-       Neurological:        [x] No Facial Asymmetry (Cranial nerve 7 motor function) (limited exam to video visit)          [x] No gaze palsy        [] Abnormal-         Skin:        [x] No significant exanthematous lesions or discoloration noted on facial skin         [] Abnormal-            Psychiatric:       [x] Normal Affect [x] No Hallucinations        [] Abnormal-     Other pertinent observable physical exam findings-     Due to this being a TeleHealth encounter, evaluation of the following organ systems is limited: Vitals/Constitutional/EENT/Resp/CV/GI//MS/Neuro/Skin/Heme-Lymph-Imm. DATA:    Results for orders placed or performed during the hospital encounter of 01/17/22   COVID-19, Rapid    Specimen: Nasopharyngeal Swab   Result Value Ref Range    Specimen Description . NASOPHARYNGEAL SWAB     SARS-CoV-2, Rapid Not Detected Not Detected   Culture, Blood 1    Specimen: Blood   Result Value Ref Range    Specimen Description . BLOOD     Special Requests  L HAND 3ML     Culture NO GROWTH 5 DAYS    Culture, Blood 1    Specimen: Blood   Result Value Ref Range    Specimen Description . BLOOD     Special Requests  R HAND 1.5ML     Culture NO GROWTH 5 DAYS    Infectious Disease Intervention   Result Value Ref Range    Intervention Targeted therapy    Amylase   Result Value Ref Range    Amylase 646 (HH) 28 - 100 U/L   Lipase   Result Value Ref Range    Lipase 1,473 (H) 13 - 60 U/L   Phosphorus   Result Value Ref Range    Phosphorus 2.8 2.6 - 4.5 mg/dL   Triglyceride   Result Value Ref Range    Triglycerides 55 <150 mg/dL   Protime-INR   Result Value Ref Range    Protime 11.3 9.1 - 12.3 sec    INR 1.1    Magnesium   Result Value Ref Range    Magnesium 1.4 (L) 1.6 - 2.6 mg/dL   Comprehensive Metabolic Panel w/ Reflex to MG   Result Value Ref Range    Glucose 151 (H) 70 - 99 mg/dL    BUN 17 8 - 23 mg/dL    CREATININE 0.79 0.50 - 0.90 mg/dL    Bun/Cre Ratio NOT REPORTED 9 - 20    Calcium 8.3 (L) 8.6 - 10.4 mg/dL    Sodium 139 135 - 144 mmol/L    Potassium 3.5 (L) 3.7 - 5.3 mmol/L    Chloride 105 98 - 107 mmol/L    CO2 22 20 - 31 mmol/L    Anion Gap 12 9 - 17 mmol/L    Alkaline Phosphatase 185 (H) 35 - 104 U/L     (H) 5 - 33 U/L    AST 1,139 (H) <32 U/L    Total Bilirubin 2.92 (H) 0.3 - 1.2 mg/dL    Total Protein 6.0 (L) 6.4 - 8.3 g/dL    Albumin 3.3 (L) 3.5 - 5.2 g/dL    Albumin/Globulin Ratio 1.2 1.0 - 2.5    GFR Non-African American >60 >60 mL/min    GFR African American >60 >60 mL/min    GFR Comment          GFR Staging NOT REPORTED    CBC   Result Value Ref Range    WBC 18.5 (H) 3.5 - 11.3 k/uL    RBC 4.19 3.95 - 5.11 m/uL    Hemoglobin 12.5 11.9 - 15.1 g/dL    Hematocrit 38.0 36.3 - 47.1 %    MCV 90.7 82.6 - 102.9 fL    MCH 29.8 25.2 - 33.5 pg    MCHC 32.9 28.4 - 34.8 g/dL    RDW 15.7 (H) 11.8 - 14.4 %    Platelets 414 905 - 516 k/uL    MPV 11.1 8.1 - 13.5 fL    NRBC Automated 0.0 0.0 per 100 WBC   Calcium, Ionized   Result Value Ref Range    Calcium, Ion 1.12 (L) 1.13 - 1.33 mmol/L   Lactate, Sepsis   Result Value Ref Range    Lactic Acid, Sepsis NOT REPORTED 0.5 - 1.9 mmol/L    Lactic Acid, Sepsis, Whole Blood 2.3 (H) 0.5 - 1.9 mmol/L   SPECIMEN REJECTION   Result Value Ref Range    Specimen Source . BLOOD     Ordered Test BMP     Reason for Rejection Unable to perform testing: Specimen hemolyzed.      - NOT REPORTED    Basic Metabolic Panel   Result Value Ref Range    Glucose 121 (H) 70 - 99 mg/dL    BUN 20 8 - 23 mg/dL    CREATININE 0.75 0.50 - 0.90 mg/dL    Bun/Cre Ratio NOT REPORTED 9 - 20    Calcium 8.3 (L) 8.6 - 10.4 mg/dL    Sodium 142 135 - 144 mmol/L    Potassium 4.6 3.7 - 5.3 mmol/L    Chloride 109 (H) 98 - 107 mmol/L    CO2 23 20 - 31 mmol/L    Anion Gap 10 9 - 17 mmol/L    GFR Non-African American >60 >60 mL/min    GFR African American >60 >60 mL/min    GFR Comment          GFR Staging NOT REPORTED    CBC WITH AUTO DIFFERENTIAL   Result Value Ref Range    WBC 11.7 (H) 3.5 - 11.3 k/uL    RBC 3.65 (L) 3.95 - 5.11 m/uL    Hemoglobin 11.1 (L) 11.9 - 15.1 g/dL    Hematocrit 34.4 (L) 36.3 - 47.1 %    MCV 94.2 82.6 - 102.9 fL    MCH 30.4 25.2 - 33.5 pg    MCHC 32.3 28.4 - 34.8 g/dL    RDW 16.4 (H) 11.8 - 14.4 %    Platelets 059 601 - 095 k/uL    MPV 11.6 8.1 - 13.5 fL    NRBC Automated 0.0 0.0 per 100 WBC    Differential Type NOT REPORTED     WBC Morphology NOT REPORTED     RBC Morphology ANISOCYTOSIS PRESENT     Platelet Estimate NOT REPORTED Seg Neutrophils 77 (H) 36 - 65 %    Lymphocytes 16 (L) 24 - 43 %    Monocytes 6 3 - 12 %    Eosinophils % 1 1 - 4 %    Basophils 0 0 - 2 %    Immature Granulocytes 0 0 %    Segs Absolute 9.04 (H) 1.50 - 8.10 k/uL    Absolute Lymph # 1.87 1.10 - 3.70 k/uL    Absolute Mono # 0.64 0.10 - 1.20 k/uL    Absolute Eos # 0.08 0.00 - 0.44 k/uL    Basophils Absolute 0.04 0.00 - 0.20 k/uL    Absolute Immature Granulocyte 0.04 0.00 - 0.30 k/uL   Comprehensive Metabolic Panel w/ Reflex to MG   Result Value Ref Range    Glucose 104 (H) 70 - 99 mg/dL    BUN 20 8 - 23 mg/dL    CREATININE 0.73 0.50 - 0.90 mg/dL    Bun/Cre Ratio NOT REPORTED 9 - 20    Calcium 8.1 (L) 8.6 - 10.4 mg/dL    Sodium 137 135 - 144 mmol/L    Potassium 3.8 3.7 - 5.3 mmol/L    Chloride 107 98 - 107 mmol/L    CO2 22 20 - 31 mmol/L    Anion Gap 8 (L) 9 - 17 mmol/L    Alkaline Phosphatase 189 (H) 35 - 104 U/L     (H) 5 - 33 U/L     (H) <32 U/L    Total Bilirubin 2.19 (H) 0.3 - 1.2 mg/dL    Total Protein 5.3 (L) 6.4 - 8.3 g/dL    Albumin 2.6 (L) 3.5 - 5.2 g/dL    Albumin/Globulin Ratio 1.0 1.0 - 2.5    GFR Non-African American >60 >60 mL/min    GFR African American >60 >60 mL/min    GFR Comment          GFR Staging NOT REPORTED    PROTIME-INR   Result Value Ref Range    Protime 12.3 9.1 - 12.3 sec    INR 1.2    Lipase   Result Value Ref Range    Lipase 239 (H) 13 - 60 U/L   Cytology, Non-Gyn   Result Value Ref Range    Specimen Description NOT REPORTED     Case Number: [id number]    Comprehensive Metabolic Panel w/ Reflex to MG   Result Value Ref Range    Glucose 109 (H) 70 - 99 mg/dL    BUN 26 (H) 8 - 23 mg/dL    CREATININE 0.77 0.50 - 0.90 mg/dL    Bun/Cre Ratio NOT REPORTED 9 - 20    Calcium 8.5 (L) 8.6 - 10.4 mg/dL    Sodium 136 135 - 144 mmol/L    Potassium 4.7 3.7 - 5.3 mmol/L    Chloride 106 98 - 107 mmol/L    CO2 17 (L) 20 - 31 mmol/L    Anion Gap 13 9 - 17 mmol/L    Alkaline Phosphatase 201 (H) 35 - 104 U/L     (H) 5 - 33 U/L     (H) <32 U/L    Total Bilirubin 1.08 0.3 - 1.2 mg/dL    Total Protein 5.8 (L) 6.4 - 8.3 g/dL    Albumin 2.5 (L) 3.5 - 5.2 g/dL    Albumin/Globulin Ratio 0.8 (L) 1.0 - 2.5    GFR Non-African American >60 >60 mL/min    GFR African American >60 >60 mL/min    GFR Comment          GFR Staging NOT REPORTED    SURGICAL PATHOLOGY REPORT   Result Value Ref Range    Surgical Pathology Report       -- Diagnosis --  FINE NEEDLE ASPIRATION, TAIL OF THE PANCREAS, EUS:          POSITIVE FOR MALIGNANCY. FINDINGS CONSISTENT WITH POORLY DIFFERENTIATED CARCINOMA, HIGH NUCLEAR  GRADE    (SEE COMMENT)               -- Diagnosis Comment --    The cytologic findings show malignant cells with high-grade nuclear  atypia raising a differential diagnosis of poorly differentiated  adenocarcinoma and undifferentiated carcinoma. This case was seen in intradepartmental consultation (NARGIS) for quality  assurance purposes. Salena Barbosa D.O.  **Electronically Signed Out**         [facility]/1/21/2022       Clinical Information    Biliary obstruction. Source of Specimen  1: FINE NEEDLE ASPIRATION TAIL OF THE PANCREAS EUS    Gross Description  \"TAIL OF THE PANCREAS\" Specimen received in CytoLyt solution, light  red fluid with white flecks, 2 DQ slides prepared. IMMEDIATE EVALUATION:    Evaluation episode: Tail of the Pancreas. Rapid interpretation: Rare atypical cells noted. Rhina hodges DO. MICROSCOPIC DESCRIPTION     Direct smears demonstrate scattered atypical cells with enlarged  irregularly shaped pleomorphic nuclei. Similar atypical cells are  noted in the ThinPrep slide and cell block sediment sections. [address]       Patient Name: Lane Alves: [id number]  Path Number: [id number]    [facility] [address]   [place], [address]  [phone]  Fax: [phone]     Cancer Antigen 19-9 Result Value Ref Range    CA 19-9 137 (H) 0 - 35 U/mL   Comprehensive Metabolic Panel w/ Reflex to MG   Result Value Ref Range    Glucose 152 (H) 70 - 99 mg/dL    BUN 33 (H) 8 - 23 mg/dL    CREATININE 0.76 0.50 - 0.90 mg/dL    Bun/Cre Ratio NOT REPORTED 9 - 20    Calcium 8.0 (L) 8.6 - 10.4 mg/dL    Sodium 136 135 - 144 mmol/L    Potassium 4.5 3.7 - 5.3 mmol/L    Chloride 107 98 - 107 mmol/L    CO2 20 20 - 31 mmol/L    Anion Gap 9 9 - 17 mmol/L    Alkaline Phosphatase 148 (H) 35 - 104 U/L     (H) 5 - 33 U/L    AST 74 (H) <32 U/L    Total Bilirubin 0.66 0.3 - 1.2 mg/dL    Total Protein 5.2 (L) 6.4 - 8.3 g/dL    Albumin 2.5 (L) 3.5 - 5.2 g/dL    Albumin/Globulin Ratio 0.9 (L) 1.0 - 2.5    GFR Non-African American >60 >60 mL/min    GFR African American >60 >60 mL/min    GFR Comment          GFR Staging NOT REPORTED    Cancer Antigen 19-9   Result Value Ref Range    CA 19-9 109 (H) 0 - 35 U/mL   EKG 12 Lead   Result Value Ref Range    Ventricular Rate 76 BPM    Atrial Rate 76 BPM    P-R Interval 140 ms    QRS Duration 80 ms    Q-T Interval 388 ms    QTc Calculation (Bazett) 436 ms    P Axis 88 degrees    R Axis -4 degrees    T Axis 34 degrees     CT CHEST W CONTRAST    Result Date: 1/21/2022  EXAMINATION: CT OF THE CHEST WITH CONTRAST 1/21/2022 1:11 pm TECHNIQUE: CT of the chest was performed with the administration of intravenous contrast. Multiplanar reformatted images are provided for review. Dose modulation, iterative reconstruction, and/or weight based adjustment of the mA/kV was utilized to reduce the radiation dose to as low as reasonably achievable. COMPARISON: CT abdomen and pelvis dated 01/17/2022, from an outside facility. , no previous imaging of the chest HISTORY: ORDERING SYSTEM PROVIDED HISTORY: pancreatic mass TECHNOLOGIST PROVIDED HISTORY: pancreatic mass Reason for Exam: pancreatic mass FINDINGS: Mediastinum: The heart is enlarged. There is no pericardial effusion.  Thoracic aorta is normal in caliber. Pulmonary arteries are normal in caliber. There is diffuse soft tissue density in the paratracheal mediastinum as well as in the AP window, most suggestive of confluent lymphadenopathy, although discrete lymph nodes are difficult to visualize. No definite hilar lymphadenopathy. Lungs/pleura: There is a moderate right and small left pleural effusion. There is associated bibasilar atelectasis. There is scattered interlobular septal thickening and mild bilateral perihilar congestion. No pneumothorax. Upper Abdomen: Intrahepatic biliary dilatation appears mildly improved when compared to 01/17/2022. Pancreas is not fully included in the field of view. There are a couple partially visualized mildly prominent periportal lymph nodes, which are unchanged. Gallbladder is not included in the field of view. Soft Tissues/Bones: There is no acute or suspicious osseous abnormality. Visualized superficial soft tissues are within normal limits. 1.  Ill-defined soft tissue density in the mediastinum is concerning for conglomerated lymph nodes, likely metastatic. 2.  Moderate right and small left pleural effusion with scattered interlobular septal thickening and perihilar congestion, suggesting CHF. Heart is enlarged. 3.  Intrahepatic biliary dilatation appears mildly improved when compared to 01/17/2022. Gallbladder and pancreas are not fully included in the field of view. Mildly prominent periportal lymph nodes are partially visualized. RECOMMENDATIONS: Unavailable     NM HEPATOBILIARY    Result Date: 1/19/2022  EXAMINATION: NUCLEAR MEDICINE HEPATOBILIARY SCINTIGRAPHY (HIDA SCAN). 1/18/2022 2:03 pm TECHNIQUE: Patient was pretreated with 1.3 mcg of cholecystokinin. Approximately 3.0 mCi Tc-99m Mebrofenin (Choletec) was administered IV. Then, dynamic images of the abdomen were obtained in the anterior projection for 60 min(s). A right lateral view was also obtained at 60 min(s).  COMPARISON: Ultrasound of the gallbladder of earlier the same day demonstrating thickened gallbladder wall and debris in the gallbladder. HISTORY: ORDERING SYSTEM PROVIDED HISTORY: Concern for acute cholecystitis and CBD obstruction TECHNOLOGIST PROVIDED HISTORY: Pt has pacemaker and unable to have MRI Concern for acute cholecystitis and CBD obstruction Reason for Exam: Concern for acute cholecystitis and CBD obstruction FINDINGS: Prompt, homogenous uptake by the liver is noted. Despite delayed imaging up to 2 hours after injection of radiotracer, contrast is not excreted into the biliary tree and does not appear in the gallbladder or small bowel. The patient was brought back at 18 hours after injection of radiotracer. At 18 hours, the gallbladder is visualized in small bowel is visualized. No excretion of contrast into the extrahepatic biliary tree or small bowel at 2 hours. Gallbladder is small bowel or visualized at 18 hours, delayed visualization compatible with chronic cholecystitis. RECOMMENDATIONS: Unavailable     US GI ENDOSCOPIC S&I    Result Date: 1/20/2022  Radiology exam is complete. No Radiologist dictation. Please follow up with ordering provider. US GALLBLADDER RUQ    Result Date: 1/18/2022  EXAMINATION: RIGHT UPPER QUADRANT ULTRASOUND 1/18/2022 7:18 am COMPARISON: None. HISTORY: ORDERING SYSTEM PROVIDED HISTORY: ?gallstone pancreatitis TECHNOLOGIST PROVIDED HISTORY: ?gallstone pancreatitis FINDINGS: LIVER:  The liver is normal in size and echotexture. There is no ductal dilatation or mass. A 7 mm simple left hepatic cyst is noted. BILIARY SYSTEM: Cholelithiases are present in lumen of the gallbladder. There is significant gallbladder wall edema, nonspecific in the presence of ascites. The common bile duct measures 2 mm. RIGHT KIDNEY: The right kidney is unremarkable measuring 12.3 cm. There is no renal mass or hydronephrosis. PANCREAS:  Visualized portions of the pancreas are unremarkable.  OTHER: A trace amount of contextual diversion. Zainab Rojas, was evaluated through a synchronous (real-time) audio-video encounter. The patient (or guardian if applicable) is aware that this is a billable service, which includes applicable co-pays. This Virtual Visit was conducted with patient's (and/or legal guardian's) consent. The visit was conducted pursuant to the emergency declaration under the [address] [place] [address] and the [employer] General Act. Patient identification was verified, and a caregiver was present when appropriate. The patient was located at home in a state where the provider was licensed to provide care. Total time spent for this encounter: Not billed by time    --Khoa Taylor MD on 1/28/2022 at 3:48 PM    An electronic signature was used to authenticate this note.

## 2022-02-01 ENCOUNTER — TELEPHONE (OUTPATIENT)
Dept: ONCOLOGY | Age: 87
End: 2022-02-01

## 2022-02-01 NOTE — TELEPHONE ENCOUNTER
Name: Katherine Estrada  : 1931  MRN: [id number]    Oncology Navigation Follow-Up Note    Contact Type:  Telephone  Notes: Writer returned from [facility] & assumed oncology navigation. Spoke with Jesica Purcell, [facility] [place] nurse, inquired on scheduling PET/CT scan & Dr. Asa Reyes. Jesica Purcell stated aware of PET/CT scan orders, family requesting PET/CT scan in [facility], will coordinate PET/CT scan along with Dr. Avelino Santos VV & contact pt. Attempted to contact pt, spoke with pt's son-in-law, Jayden Mcgarrysander, notified Justice Gilmore will be navigating care & may contact @ [phone] prn. Jayden Wharton notified Jesica Purcell will contact with PET/CT scan & Dr. Asa Reyes details. Notified Jayden Wharton may contact Tanya prn, contact # given. Jayden Wharton denied questions/concerns. Will continue to follow.     Electronically signed by Viri Peña RN on 2022 at 9:29 AM

## 2022-02-22 ENCOUNTER — TELEPHONE (OUTPATIENT)
Dept: ONCOLOGY | Age: 87
End: 2022-02-22

## 2022-02-22 ENCOUNTER — TELEMEDICINE (OUTPATIENT)
Dept: ONCOLOGY | Age: 87
End: 2022-02-22
Payer: MEDICARE

## 2022-02-22 VITALS
BODY MASS INDEX: 23.56 KG/M2 | SYSTOLIC BLOOD PRESSURE: 122 MMHG | HEIGHT: 64 IN | WEIGHT: 138 LBS | HEART RATE: 66 BPM | DIASTOLIC BLOOD PRESSURE: 78 MMHG

## 2022-02-22 DIAGNOSIS — C25.2 MALIGNANT NEOPLASM OF TAIL OF PANCREAS (HCC): ICD-10-CM

## 2022-02-22 DIAGNOSIS — C25.9 MALIGNANT NEOPLASM OF PANCREAS, UNSPECIFIED LOCATION OF MALIGNANCY (HCC): Primary | ICD-10-CM

## 2022-02-22 DIAGNOSIS — R59.0 MEDIASTINAL LYMPHADENOPATHY: ICD-10-CM

## 2022-02-22 DIAGNOSIS — K85.10 GALLSTONE PANCREATITIS: ICD-10-CM

## 2022-02-22 PROCEDURE — G8427 DOCREV CUR MEDS BY ELIG CLIN: HCPCS | Performed by: INTERNAL MEDICINE

## 2022-02-22 PROCEDURE — 1123F ACP DISCUSS/DSCN MKR DOCD: CPT | Performed by: INTERNAL MEDICINE

## 2022-02-22 PROCEDURE — 4040F PNEUMOC VAC/ADMIN/RCVD: CPT | Performed by: INTERNAL MEDICINE

## 2022-02-22 PROCEDURE — 99214 OFFICE O/P EST MOD 30 MIN: CPT | Performed by: INTERNAL MEDICINE

## 2022-02-22 PROCEDURE — 99211 OFF/OP EST MAY X REQ PHY/QHP: CPT | Performed by: INTERNAL MEDICINE

## 2022-02-22 PROCEDURE — 1090F PRES/ABSN URINE INCON ASSESS: CPT | Performed by: INTERNAL MEDICINE

## 2022-02-22 NOTE — TELEPHONE ENCOUNTER
Name: Tasia Ludwig  : 1931  MRN: [id number]    Oncology Navigation Follow-Up Note    Contact Type:  Telephone  Notes: Upon review of chart noted pt completed  PET/CT scan & Dr. Lucas Daniel today. Per Dr. William Hager f/u note pt does not wish to proceed further w/u or tx. Hospice referral offered, pt declined @ this time. Navigation dc'd.     Electronically signed by Hortensia Ramachandran RN on 2022 at 1:33 PM

## 2022-02-22 NOTE — PROGRESS NOTES
Marcela Pean                                                                                                                  2/22/2022  MRN:   [id number]  YOB: 1931  PCP:                           Heaven Lock DO  Referring Physician: No ref. provider found  Treating Physician Name: Iliana Reed MD      Reason for visit:  Chief Complaint   Patient presents with    Pancreatic Cancer     f/u post PET/CT, scanned into epic   Patient seen in clinic via virtual visit due to ongoing coronavirus pandemic. Current problems:  Poorly differentiated carcinoma, high nuclear grade, of pancreatic tail  Ascending cholangitis  Gallstone pancreatitis  Para-aortic lymphadenopathy per CT scan at outside hospital  Mediastinal soft tissue density    Summary of Case/History:    Marcela aH a 80 y. o.female who is admitted to the hospital for ascending cholangitis. Per patient she experienced some abdominal pain, nausea, vomiting and fatigue and was taken to [facility] via family for concerns of acute cholecystitis vs pancreatitis. AST was 726, , Bilirubin elevated 2.2 with indirect bilirubin 1.2, lipase 18040, WBC 11.5. CT abdomen and pelvis with contrast demonstrated dilation and wall thickening of the gallbladder and dilated common bile duct with fat stranding around the pancreas. She was transferred to [address] [place] [address] for ERCP and GS eval. Underwent EUS 1/19/2022 which demonstrated a 10 mm by 12 mm mass in the tail of the pancreas. Oncology was consulted.      Spoke with patient at bedside. She does have baseline dementia so history difficult to obtain. However at this time denies any symptoms. Denies family history of cancer. Interim History:    Patient seen in clinic via virtual visit due to ongoing coronavirus pandemic. Overall patient has been doing reasonably well. She has completed IV antibiotic course. Denies any pain. Does complain of being weak.   Denies hospitalization or ER visit. During this visit patient's allergy, social, medical, surgical history and medications were reviewed and updated. Past Medical History:   Past Medical History:   Diagnosis Date    Essential hypertension     Hyperlipidemia     Pre-diabetes        Past Surgical History:     Past Surgical History:   Procedure Laterality Date    APPENDECTOMY N/A     COLONOSCOPY N/A     INSERT MIDLINE CATHETER  1/21/2022         PACEMAKER INSERTION N/A 12/30/2015    PACEMAKER PLACEMENT      [employer] pacemaker, NOT MRI SAFE per [employer]- krm    KIRSTEN AND BSO N/A     UPPER GASTROINTESTINAL ENDOSCOPY  01/19/2022    w/EUS FNA    UPPER GASTROINTESTINAL ENDOSCOPY N/A 1/19/2022    EGD DIAGNOSTIC ONLY performed by Rishi Adame MD at [address] N/A 1/19/2022    EGD W/EUS FNA performed by Rishi Adame MD at [place] [address]       Patient Family Social History:     Family History   Problem Relation Age of Onset    No Known Problems Mother     No Known Problems Father      Social History     Socioeconomic History    Marital status:      Spouse name: None    Number of children: None    Years of education: None    Highest education level: None   Occupational History    None   Tobacco Use    Smoking status: Never Smoker    Smokeless tobacco: Never Used   Substance and Sexual Activity    Alcohol use: Never    Drug use: Never    Sexual activity: None   Other Topics Concern    None   Social History Narrative    None     Social Determinants of Health     Financial Resource Strain:     Difficulty of Paying Living Expenses: Not on file   Food Insecurity:     Worried About Running Out of Food in the Last Year: Not on file    Fred of Food in the Last Year: Not on file   Transportation Needs:     Lack of Transportation (Medical): Not on file    Lack of Transportation (Non-Medical):  Not on file   Physical Activity:     Days of Exercise per Week: Not on file    Minutes of Exercise per Session: Not on file   Stress:     Feeling of Stress : Not on file   Social Connections:     Frequency of Communication with Friends and Family: Not on file    Frequency of Social Gatherings with Friends and Family: Not on file    Attends Zoroastrianism Services: Not on file    Active Member of [address] [place] [address] or Organizations: Not on file    Attends Club or Organization Meetings: Not on file    Marital Status: Not on file   Intimate Partner Violence:     Fear of Current or Ex-Partner: Not on file    Emotionally Abused: Not on file    Physically Abused: Not on file    Sexually Abused: Not on file   Housing Stability:     Unable to Pay for Housing in the Last Year: Not on file    Number of [place] in the Last Year: Not on file    Unstable Housing in the Last Year: Not on file       Current Medications:     Current Outpatient Medications   Medication Sig Dispense Refill    atorvastatin (LIPITOR) 40 MG tablet Take 40 mg by mouth daily      losartan (COZAAR) 100 MG tablet Take 100 mg by mouth daily      pantoprazole (PROTONIX) 40 MG tablet Take 40 mg by mouth daily       No current facility-administered medications for this visit. Allergies:   Adhesive tape    Review of Systems:    Constitutional: No fever or chills. No night sweats, no weight loss   Eyes: No eye discharge, double vision, or eye pain   HEENT: negative for sore mouth, sore throat, hoarseness and voice change   Respiratory: negative for cough , sputum, dyspnea, wheezing, hemoptysis, chest pain   Cardiovascular: negative for chest pain, dyspnea, palpitations, orthopnea, PND   Gastrointestinal: negative for nausea, vomiting, diarrhea, constipation, abdominal pain, Dysphagia, hematemesis and hematochezia   Genitourinary: negative for frequency, dysuria, nocturia, urinary incontinence, and hematuria   Integument: negative for rash, skin lesions, bruises.    Hematologic/Lymphatic: negative for easy bruising, bleeding, lymphadenopathy, or petechiae   Endocrine: negative for heat or cold intolerance,weight changes, change in bowel habits and hair loss   Musculoskeletal: negative for myalgias, arthralgias, pain, joint swelling,and bone pain   Neurological: negative for headaches, dizziness, seizures, weakness, numbness          PHYSICAL EXAMINATION:    Vital Signs: (As obtained by patient/caregiver or practitioner observation)    Blood pressure-  Heart rate-    Respiratory rate-    Temperature-  Pulse oximetry-     Constitutional: [x] Appears well-developed and well-nourished [x] No apparent distress      [] Abnormal-   Mental status  [x] Alert and awake  [x] Oriented to person/place/time [x]Able to follow commands      Eyes:  EOM    [x]  Normal  [] Abnormal-  Sclera  [x]  Normal  [] Abnormal -         Discharge [x]  None visible  [] Abnormal -    HENT:   [x] Normocephalic, atraumatic. [] Abnormal   [x] Mouth/Throat: Mucous membranes are moist.     External Ears [x] Normal  [] Abnormal-     Neck: [x] No visualized mass     Pulmonary/Chest: [x] Respiratory effort normal.  [x] No visualized signs of difficulty breathing or respiratory distress        [] Abnormal-      Musculoskeletal:   [] Normal gait with no signs of ataxia         [x] Normal range of motion of neck        [] Abnormal-       Neurological:        [x] No Facial Asymmetry (Cranial nerve 7 motor function) (limited exam to video visit)          [x] No gaze palsy        [] Abnormal-         Skin:        [x] No significant exanthematous lesions or discoloration noted on facial skin         [] Abnormal-            Psychiatric:       [x] Normal Affect [x] No Hallucinations        [] Abnormal-     Other pertinent observable physical exam findings-     Due to this being a TeleHealth encounter, evaluation of the following organ systems is limited: Vitals/Constitutional/EENT/Resp/CV/GI//MS/Neuro/Skin/Heme-Lymph-Imm.         DATA:    Results for orders placed or performed during the hospital encounter of 01/17/22   COVID-19, Rapid    Specimen: Nasopharyngeal Swab   Result Value Ref Range    Specimen Description . NASOPHARYNGEAL SWAB     SARS-CoV-2, Rapid Not Detected Not Detected   Culture, Blood 1    Specimen: Blood   Result Value Ref Range    Specimen Description . BLOOD     Special Requests  L HAND 3ML     Culture NO GROWTH 5 DAYS    Culture, Blood 1    Specimen: Blood   Result Value Ref Range    Specimen Description . BLOOD     Special Requests  R HAND 1.5ML     Culture NO GROWTH 5 DAYS    Infectious Disease Intervention   Result Value Ref Range    Intervention Targeted therapy    Amylase   Result Value Ref Range    Amylase 646 (HH) 28 - 100 U/L   Lipase   Result Value Ref Range    Lipase 1,473 (H) 13 - 60 U/L   Phosphorus   Result Value Ref Range    Phosphorus 2.8 2.6 - 4.5 mg/dL   Triglyceride   Result Value Ref Range    Triglycerides 55 <150 mg/dL   Protime-INR   Result Value Ref Range    Protime 11.3 9.1 - 12.3 sec    INR 1.1    Magnesium   Result Value Ref Range    Magnesium 1.4 (L) 1.6 - 2.6 mg/dL   Comprehensive Metabolic Panel w/ Reflex to MG   Result Value Ref Range    Glucose 151 (H) 70 - 99 mg/dL    BUN 17 8 - 23 mg/dL    CREATININE 0.79 0.50 - 0.90 mg/dL    Bun/Cre Ratio NOT REPORTED 9 - 20    Calcium 8.3 (L) 8.6 - 10.4 mg/dL    Sodium 139 135 - 144 mmol/L    Potassium 3.5 (L) 3.7 - 5.3 mmol/L    Chloride 105 98 - 107 mmol/L    CO2 22 20 - 31 mmol/L    Anion Gap 12 9 - 17 mmol/L    Alkaline Phosphatase 185 (H) 35 - 104 U/L     (H) 5 - 33 U/L    AST 1,139 (H) <32 U/L    Total Bilirubin 2.92 (H) 0.3 - 1.2 mg/dL    Total Protein 6.0 (L) 6.4 - 8.3 g/dL    Albumin 3.3 (L) 3.5 - 5.2 g/dL    Albumin/Globulin Ratio 1.2 1.0 - 2.5    GFR Non-African American >60 >60 mL/min    GFR African American >60 >60 mL/min    GFR Comment          GFR Staging NOT REPORTED    CBC   Result Value Ref Range    WBC 18.5 (H) 3.5 - 11.3 k/uL    RBC 4.19 3.95 - 5.11 m/uL    Hemoglobin 12.5 11.9 - 15.1 g/dL    Hematocrit 38.0 36.3 - 47.1 %    MCV 90.7 82.6 - 102.9 fL    MCH 29.8 25.2 - 33.5 pg    MCHC 32.9 28.4 - 34.8 g/dL    RDW 15.7 (H) 11.8 - 14.4 %    Platelets 223 595 - 799 k/uL    MPV 11.1 8.1 - 13.5 fL    NRBC Automated 0.0 0.0 per 100 WBC   Calcium, Ionized   Result Value Ref Range    Calcium, Ion 1.12 (L) 1.13 - 1.33 mmol/L   Lactate, Sepsis   Result Value Ref Range    Lactic Acid, Sepsis NOT REPORTED 0.5 - 1.9 mmol/L    Lactic Acid, Sepsis, Whole Blood 2.3 (H) 0.5 - 1.9 mmol/L   SPECIMEN REJECTION   Result Value Ref Range    Specimen Source . BLOOD     Ordered Test BMP     Reason for Rejection Unable to perform testing: Specimen hemolyzed.      - NOT REPORTED    Basic Metabolic Panel   Result Value Ref Range    Glucose 121 (H) 70 - 99 mg/dL    BUN 20 8 - 23 mg/dL    CREATININE 0.75 0.50 - 0.90 mg/dL    Bun/Cre Ratio NOT REPORTED 9 - 20    Calcium 8.3 (L) 8.6 - 10.4 mg/dL    Sodium 142 135 - 144 mmol/L    Potassium 4.6 3.7 - 5.3 mmol/L    Chloride 109 (H) 98 - 107 mmol/L    CO2 23 20 - 31 mmol/L    Anion Gap 10 9 - 17 mmol/L    GFR Non-African American >60 >60 mL/min    GFR African American >60 >60 mL/min    GFR Comment          GFR Staging NOT REPORTED    CBC WITH AUTO DIFFERENTIAL   Result Value Ref Range    WBC 11.7 (H) 3.5 - 11.3 k/uL    RBC 3.65 (L) 3.95 - 5.11 m/uL    Hemoglobin 11.1 (L) 11.9 - 15.1 g/dL    Hematocrit 34.4 (L) 36.3 - 47.1 %    MCV 94.2 82.6 - 102.9 fL    MCH 30.4 25.2 - 33.5 pg    MCHC 32.3 28.4 - 34.8 g/dL    RDW 16.4 (H) 11.8 - 14.4 %    Platelets 197 285 - 849 k/uL    MPV 11.6 8.1 - 13.5 fL    NRBC Automated 0.0 0.0 per 100 WBC    Differential Type NOT REPORTED     WBC Morphology NOT REPORTED     RBC Morphology ANISOCYTOSIS PRESENT     Platelet Estimate NOT REPORTED     Seg Neutrophils 77 (H) 36 - 65 %    Lymphocytes 16 (L) 24 - 43 %    Monocytes 6 3 - 12 %    Eosinophils % 1 1 - 4 %    Basophils 0 0 - 2 %    Immature Granulocytes 0 0 %    Segs Absolute 9.04 (H) 1.50 - 8.10 k/uL    Absolute Lymph # 1.87 1.10 - 3.70 k/uL    Absolute Mono # 0.64 0.10 - 1.20 k/uL    Absolute Eos # 0.08 0.00 - 0.44 k/uL    Basophils Absolute 0.04 0.00 - 0.20 k/uL    Absolute Immature Granulocyte 0.04 0.00 - 0.30 k/uL   Comprehensive Metabolic Panel w/ Reflex to MG   Result Value Ref Range    Glucose 104 (H) 70 - 99 mg/dL    BUN 20 8 - 23 mg/dL    CREATININE 0.73 0.50 - 0.90 mg/dL    Bun/Cre Ratio NOT REPORTED 9 - 20    Calcium 8.1 (L) 8.6 - 10.4 mg/dL    Sodium 137 135 - 144 mmol/L    Potassium 3.8 3.7 - 5.3 mmol/L    Chloride 107 98 - 107 mmol/L    CO2 22 20 - 31 mmol/L    Anion Gap 8 (L) 9 - 17 mmol/L    Alkaline Phosphatase 189 (H) 35 - 104 U/L     (H) 5 - 33 U/L     (H) <32 U/L    Total Bilirubin 2.19 (H) 0.3 - 1.2 mg/dL    Total Protein 5.3 (L) 6.4 - 8.3 g/dL    Albumin 2.6 (L) 3.5 - 5.2 g/dL    Albumin/Globulin Ratio 1.0 1.0 - 2.5    GFR Non-African American >60 >60 mL/min    GFR African American >60 >60 mL/min    GFR Comment          GFR Staging NOT REPORTED    PROTIME-INR   Result Value Ref Range    Protime 12.3 9.1 - 12.3 sec    INR 1.2    Lipase   Result Value Ref Range    Lipase 239 (H) 13 - 60 U/L   Cytology, Non-Gyn   Result Value Ref Range    Specimen Description NOT REPORTED     Case Number: [id number]    Comprehensive Metabolic Panel w/ Reflex to MG   Result Value Ref Range    Glucose 109 (H) 70 - 99 mg/dL    BUN 26 (H) 8 - 23 mg/dL    CREATININE 0.77 0.50 - 0.90 mg/dL    Bun/Cre Ratio NOT REPORTED 9 - 20    Calcium 8.5 (L) 8.6 - 10.4 mg/dL    Sodium 136 135 - 144 mmol/L    Potassium 4.7 3.7 - 5.3 mmol/L    Chloride 106 98 - 107 mmol/L    CO2 17 (L) 20 - 31 mmol/L    Anion Gap 13 9 - 17 mmol/L    Alkaline Phosphatase 201 (H) 35 - 104 U/L     (H) 5 - 33 U/L     (H) <32 U/L    Total Bilirubin 1.08 0.3 - 1.2 mg/dL    Total Protein 5.8 (L) 6.4 - 8.3 g/dL    Albumin 2.5 (L) 3.5 - 5.2 g/dL    Albumin/Globulin Ratio 0.8 (L) 1.0 - 2.5    GFR - 23 mg/dL    CREATININE 0.76 0.50 - 0.90 mg/dL    Bun/Cre Ratio NOT REPORTED 9 - 20    Calcium 8.0 (L) 8.6 - 10.4 mg/dL    Sodium 136 135 - 144 mmol/L    Potassium 4.5 3.7 - 5.3 mmol/L    Chloride 107 98 - 107 mmol/L    CO2 20 20 - 31 mmol/L    Anion Gap 9 9 - 17 mmol/L    Alkaline Phosphatase 148 (H) 35 - 104 U/L     (H) 5 - 33 U/L    AST 74 (H) <32 U/L    Total Bilirubin 0.66 0.3 - 1.2 mg/dL    Total Protein 5.2 (L) 6.4 - 8.3 g/dL    Albumin 2.5 (L) 3.5 - 5.2 g/dL    Albumin/Globulin Ratio 0.9 (L) 1.0 - 2.5    GFR Non-African American >60 >60 mL/min    GFR African American >60 >60 mL/min    GFR Comment          GFR Staging NOT REPORTED    Cancer Antigen 19-9   Result Value Ref Range    CA 19-9 109 (H) 0 - 35 U/mL   EKG 12 Lead   Result Value Ref Range    Ventricular Rate 76 BPM    Atrial Rate 76 BPM    P-R Interval 140 ms    QRS Duration 80 ms    Q-T Interval 388 ms    QTc Calculation (Bazett) 436 ms    P Axis 88 degrees    R Axis -4 degrees    T Axis 34 degrees     IMPRESSION:     1. No clear evidence of focal pancreatic uptake to correspond to the   known mass. This may correspond to a small tumor, or may be a neoplasm   with weak FDG avidity. 2. Numerous, prominent sized retroperitoneal lymph nodes, as well as   mediastinal/right hilar lymph nodes, right axillary lymph nodes, and   lower cervical lymph nodes. With the exception of a right perihilar   lymph node, most of these demonstrate only mild FDG avidity. Metastatic disease from a neoplasm with weak FDG avidity is not   excluded. Impression:  Poorly differentiated carcinoma, high nuclear grade, of pancreatic tail  Ascending cholangitis  Gallstone pancreatitis  Para-aortic lymphadenopathy per CT scan at outside hospital  FDG avid retroperitoneal and thoracic lymph nodes    Plan:  I had a detailed discussion with the patient and personally went over results of lab work-up imaging studies and other relevant clinical data.   Discussed results of CT PET with the patient. Patient is now off antibiotics. Still complains of being tired. CT PET shows findings of FDG avid lymph node most concerning one is in the right hilar lymph node. However CT PET does not light up on the pancreas possibly due to small size of the tumor which was biopsy-proven malignancy. Reviewed goals and expectations. Recommend biopsy of the right hilar lymph node however patient does not want to pursue any further work-up or treatment. She is very clear that she is not interested in any chemotherapy if she is diagnosed with a widespread metastasis. I also offered continued surveillance with repeat scans in 3 months but patient is not interested in that. We also discussed hospice but patient would like to hold off for now she will let us know if she will change her mind. We will set a follow-up appointment as needed  Patient and her daughter had multiple questions which answered the best my ability. Stefanie Cartwright MD    This note is created with the assistance of a speech recognition program.  While intending to generate a document that actually reflects the content of the visit, the document can still have some errors including those of syntax and sound a like substitutions which may escape proof reading. It such instances, actual meaning can be extrapolated by contextual diversion. Valeria Justin, was evaluated through a synchronous (real-time) audio-video encounter. The patient (or guardian if applicable) is aware that this is a billable service, which includes applicable co-pays. This Virtual Visit was conducted with patient's (and/or legal guardian's) consent. The visit was conducted pursuant to the emergency declaration under the [address] [place] [address] authority and the [employer] and [employer] General Act. Patient identification was verified, and a caregiver was present when appropriate.  The patient was located at home in a state where the provider was licensed to provide care. Total time spent for this encounter: Not billed by time    --Rocio Yu MD on 2/22/2022 at 11:30 AM    An electronic signature was used to authenticate this note.

## 2025-01-24 NOTE — H&P
"Progress Note - Hospitalist   Name: Daniel Sanz Jr. 83 y.o. male I MRN: [id number]  Unit/Bed#: S -01 I Date of Admission: 1/21/2025   Date of Service: 1/24/2025 I Hospital Day: 3    Assessment & Plan  Altered mental status  Patient's wife endorsing recent abnormal behaviors.  Patient waking frequently at night, hallucinations noted when awaking from sleep, patient noted to be more \"nasty\" and agitated with at times garbled speech which clears.  Presents to ED after fall at home with concern for possible UTI. S/p head strike in ED w/ R eyebrow laceration.  Suspect probable Parkinson's dementia with mild acute encephalopathy  CT head w/o acute findings  TSH benign   Folate WNL, B12 normal, vitamin D low at 26.9  Wife notes B12/vitamin D supplementation 3 times weekly PTA  Continue to q daily dosing of vit D  B12 3x weekly  Negative infxn work-up (as below)   Consult geriatrics, input appreciated  Sinemet 12.5 mg q8h for agitation and hallucinations  Acetaminophen 650mg TID  Neurology consulted for recent sinemet increase  Clinically appears dry; provide gentle IV fluids  BM 1/23  Fall  S/p head strike in ED w/ R eyebrow laceration; unclear if mechanical versus orthostatic hypotensive episode  CT head/neck and left shoulder benign  Laceration cleaned with dressing placed; no sutures placed  F/u orthostatic vital signs  ECG without ischemia, troponins negative x 3  PT OT consulted- recommending rehab  Hypothermia (Resolved: 1/24/2025)  POA, temp 95.6-96  Received marcella hunger in the ED with resolution of hypothermia; patient grossly asymptomatic; temp stable off Marcella hugger  No other SIRS criteria  Follow-up blood cultures  UA bland; LA & procal WNL; no leukocytosis or bandemia; flu/COVID/RSV negative  Etiology likely autonomic dysregulation in setting of Parkinson's disease  Hyperkalemia (Resolved: 1/24/2025)  Recent Labs     01/22/25  0449 01/23/25  0436 01/24/25  0639   K 4.7 4.9 4.7     S/p albuterol, " [facility]  Office: [address], DO, Anu March, DO, Sandro Orozco, DO, Kelton David Blood, DO, Yves Randolph MD, Tomas Mcgovern MD, Graciela Sandoval MD, Jim Lopez MD, Jhonny Siddiqui MD, Zack Alamo MD, Asher Cortez MD, Slime Del Cid, DO, Paulina Jackson, DO, Leonardo Escobar MD,  Radha Petit, DO, Danii Vega MD, Rhina Caban MD, Caty Ruelas MD, Dari Matthew MD, Michelle Alejo MD, Mayur Romo MD, Demi Price MD, Efrain Jimenez, [facility], CNP, Maicol Chang, CNP, [facility], CNS, Eleonora Tanner, CNP, Mandy Burnham, CNP, Tamiko Mueller, CNP, Charleen Salinas, CNP, Malvin Gibson, CNP, Rajiv Cross PA-C, Salas Lord [facility], Claudette Anis, [facility], Jhoan Del Real, CNP, Liu Dudley, CNP, Epifanio Falguni, CNP, Danny Horner CNP, Maribel Beck, CNP, Maricruz Marrero, [address]    HISTORY AND PHYSICAL EXAMINATION            Date:   1/18/2022  Patient name:  Heydi Bhakta  Date of admission:  1/17/2022 11:22 PM  MRN:   [id number]  Account:  [de-identified]  YOB: 1931  PCP:    Nereida Peace DO  Room:   4591/5090-07  Code Status:    Full Code    Chief Complaint:     Chief Complaint   Patient presents with    Abdominal Pain       History Obtained From:     patient, electronic medical record    History of Present Illness:     Heydi Bhakta is a 80 y.o. female presents to hospital with complaints of abdominal pain. On admission, lipase 11,000, alk phos 148, , , T bili 2.2, lactic 1.7, WBC 11.5. Gallbladder ultrasound showed cholelithiasis with significant gallbladder wall edema concerning for pancreatitis/hepatitis/acute cholecystitis. MRCP stat was ordered however this could not be done due to patient's history of pacemaker not being compatible. Instead underwent HIDA scan which showed on is consistent with acute cholecystitis and biliary ductal obstruction.   Patient was insulin, amp of d50, Lasix 40 mg in ED  Repeat BMP pending  Etiology unclear  Hold ARB  Parkinson's disease (HCC)  Known to  neurology   Sinemet now will be 1.5 tab 4x a day  Likely contributing to altered mentation  Idiopathic chronic venous hypertension of right leg with ulcer (HCC)  Chronic right lower extremity venous ulcers   PTA Lasix for LE edema management   Known to [facility]   Wound care consulted  HTN (hypertension)  BP stable  Hold PTA ARB w/ hyperkalemia, resume when able  Continue PTA amlodipine  Dementia (HCC)    Constipation (Resolved: 1/24/2025)  Unknown LBM, wife estimates it may have been a week  1/23 KUB: Moderate colonic burden  Increased Miralax to BID and ordered dulcolax suppository    Dysphagia  Patient observed coughing while taking medications by nursing.  Speech therapy consulted  Patient somnolent on evaluation so they are currently recommending dysphagia 1 diet with nectar thick liquids while awake and pills crushed.  Patient was to have outpatient VBS test, per speech he may need during admission but will have to be consistently awake first  Speech to continue to follow  Anemia  Chronic disease    VTE Pharmacologic Prophylaxis: VTE Score: 4 Moderate Risk (Score 3-4) - Pharmacological DVT Prophylaxis Ordered: enoxaparin (Lovenox).    Mobility:   Basic Mobility Inpatient Raw Score: 8  JH-HLM Goal: 3: Sit at edge of bed  JH-HLM Achieved: 3: Sit at edge of bed  JH-HLM Goal achieved. Continue to encourage appropriate mobility.    Patient Centered Rounds: I performed bedside rounds with nursing staff today.   Discussions with Specialists or Other Care Team Provider: Neurology, Geriatrics    Education and Discussions with Family / Patient: Updated  (wife, nephew, niece, and daughter in law) at bedside.    Current Length of Stay: 3 day(s)  Current Patient Status: Inpatient   Certification Statement: The patient will continue to require additional inpatient  started on broad-spectrum IV antibiotics, IV fluids for pancreatitis, and general surgery was consulted as well as GI. Past Medical History:     Past Medical History:   Diagnosis Date    Essential hypertension     Hyperlipidemia     Pre-diabetes         Past Surgical History:     Past Surgical History:   Procedure Laterality Date    APPENDECTOMY N/A     COLONOSCOPY N/A     PACEMAKER INSERTION N/A 12/30/2015    PACEMAKER PLACEMENT      [employer] pacemaker, NOT MRI SAFE per [employer]- [facility]    KIRSTEN AND BSO N/A         Medications Prior to Admission:     Prior to Admission medications    Not on File        Allergies:     Adhesive tape    Social History:     Tobacco:    reports that she has never smoked. She has never used smokeless tobacco.  Alcohol:      reports no history of alcohol use. Drug Use:  reports no history of drug use. Family History:     Family History   Problem Relation Age of Onset    No Known Problems Mother     No Known Problems Father        Review of Systems:     Positive and Negative as described in HPI.     CONSTITUTIONAL:  negative for fevers, chills, sweats, fatigue, weight loss  HEENT:  negative for vision, hearing changes, runny nose, throat pain  RESPIRATORY:  negative for shortness of breath, cough, congestion, wheezing  CARDIOVASCULAR:  negative for chest pain, palpitations  GASTROINTESTINAL:  negative for nausea, vomiting, diarrhea, constipation, change in bowel habits, abdominal pain   GENITOURINARY:  negative for difficulty of urination, burning with urination, frequency   INTEGUMENT:  negative for rash, skin lesions, easy bruising   HEMATOLOGIC/LYMPHATIC:  negative for swelling/edema   ALLERGIC/IMMUNOLOGIC:  negative for urticaria , itching  ENDOCRINE:  negative increase in drinking, increase in urination, hot or cold intolerance  MUSCULOSKELETAL:  negative joint pains, muscle aches, swelling of joints  NEUROLOGICAL:  negative for headaches, dizziness, hospital stay due to placement  Discharge Plan: Anticipate discharge in 48 hrs to rehab facility.    Code Status: Level 1 - Full Code    Subjective   Patient is awake and alert, his family is feeding him lunch and he is tolerating well. His wife feels he is doing a bit better. He had a BM yesterday per nursing.     Objective :  Temp:  [97.7 °F (36.5 °C)-97.9 °F (36.6 °C)] 97.7 °F (36.5 °C)  HR:  [73-83] 83  BP: (129-143)/(59-87) 139/87  Resp:  [16-17] 17  SpO2:  [95 %-97 %] 97 %  O2 Device: None (Room air)    There is no height or weight on file to calculate BMI.     Input and Output Summary (last 24 hours):     Intake/Output Summary (Last 24 hours) at 1/24/2025 1618  Last data filed at 1/24/2025 1501  Gross per 24 hour   Intake --   Output 1100 ml   Net -1100 ml       Physical Exam  Vitals and nursing note reviewed.   Constitutional:       General: He is not in acute distress.     Appearance: He is well-developed.   HENT:      Head: Normocephalic and atraumatic.   Eyes:      Extraocular Movements: Extraocular movements intact.   Cardiovascular:      Rate and Rhythm: Normal rate and regular rhythm.      Heart sounds: No murmur heard.  Pulmonary:      Effort: Pulmonary effort is normal. No respiratory distress.      Breath sounds: Normal breath sounds. No wheezing.   Abdominal:      General: There is no distension.      Palpations: Abdomen is soft.      Tenderness: There is no abdominal tenderness.   Musculoskeletal:         General: No swelling.      Cervical back: Neck supple.      Right lower leg: No edema.      Left lower leg: No edema.   Skin:     General: Skin is warm and dry.   Neurological:      Mental Status: He is alert.   Psychiatric:         Mood and Affect: Mood normal.           Lines/Drains:              Lab Results: I have reviewed the following results:   Results from last 7 days   Lab Units 01/24/25  0639 01/21/25  2319 01/21/25  1226   WBC Thousand/uL 6.20   < > 5.35   HEMOGLOBIN g/dL 10.8*   < >  9.9*   HEMATOCRIT % 32.7*   < > 30.6*   PLATELETS Thousands/uL 175   < > 174   SEGS PCT %  --   --  74   LYMPHO PCT %  --   --  16   MONO PCT %  --   --  8   EOS PCT %  --   --  2    < > = values in this interval not displayed.     Results from last 7 days   Lab Units 01/24/25  0639 01/21/25  1702 01/21/25  1226   SODIUM mmol/L 141   < > 138   POTASSIUM mmol/L 4.7   < > 6.0*   CHLORIDE mmol/L 108   < > 109*   CO2 mmol/L 27   < > 25   BUN mg/dL 24   < > 33*   CREATININE mg/dL 1.07   < > 0.98   ANION GAP mmol/L 6   < > 4   CALCIUM mg/dL 8.9   < > 8.5   ALBUMIN g/dL  --   --  3.6   TOTAL BILIRUBIN mg/dL  --   --  0.81   ALK PHOS U/L  --   --  112*   ALT U/L  --   --  4*   AST U/L  --   --  28   GLUCOSE RANDOM mg/dL 82   < > 92    < > = values in this interval not displayed.     Results from last 7 days   Lab Units 01/21/25  1230   INR  1.03             Results from last 7 days   Lab Units 01/21/25  1452 01/21/25  1226   LACTIC ACID mmol/L 0.5  --    PROCALCITONIN ng/ml  --  <0.05       Recent Cultures (last 7 days):   Results from last 7 days   Lab Units 01/21/25  1452 01/21/25  1230   BLOOD CULTURE  No Growth at 48 hrs. No Growth at 48 hrs.             Last 24 Hours Medication List:     Current Facility-Administered Medications:     acetaminophen (TYLENOL) tablet 650 mg, TID    amLODIPine (NORVASC) tablet 5 mg, Daily    Artificial Tears Op Soln 1 drop, Q4H PRN    bisacodyl (DULCOLAX) rectal suppository 10 mg, Daily    calcium carbonate (TUMS) chewable tablet 1,000 mg, Daily PRN    carbidopa-levodopa (SINEMET)  mg per tablet 2 tablet, TID **FOLLOWED BY** [START ON 1/25/2025] carbidopa-levodopa (SINEMET)  mg per tablet 1.5 tablet, 4x Daily    Cholecalciferol (VITAMIN D3) tablet 1,000 Units, Daily    cyanocobalamin (VITAMIN B-12) tablet 1,000 mcg, Daily    docusate sodium (COLACE) capsule 100 mg, BID    enoxaparin (LOVENOX) subcutaneous injection 40 mg, Daily    lidocaine (LIDODERM) 5 % patch 1 patch, Daily    degrees    R Axis -4 degrees    T Axis 34 degrees   COVID-19, Rapid    Collection Time: 01/18/22  1:18 AM    Specimen: Nasopharyngeal Swab   Result Value Ref Range    Specimen Description . NASOPHARYNGEAL SWAB     SARS-CoV-2, Rapid Not Detected Not Detected   Amylase    Collection Time: 01/18/22  4:44 AM   Result Value Ref Range    Amylase 646 (HH) 28 - 100 U/L   Lipase    Collection Time: 01/18/22  4:44 AM   Result Value Ref Range    Lipase 1,473 (H) 13 - 60 U/L   Phosphorus    Collection Time: 01/18/22  4:44 AM   Result Value Ref Range    Phosphorus 2.8 2.6 - 4.5 mg/dL   Triglyceride    Collection Time: 01/18/22  4:44 AM   Result Value Ref Range    Triglycerides 55 <150 mg/dL   Protime-INR    Collection Time: 01/18/22  4:44 AM   Result Value Ref Range    Protime 11.3 9.1 - 12.3 sec    INR 1.1    Magnesium    Collection Time: 01/18/22  4:44 AM   Result Value Ref Range    Magnesium 1.4 (L) 1.6 - 2.6 mg/dL   Comprehensive Metabolic Panel w/ Reflex to MG    Collection Time: 01/18/22  4:44 AM   Result Value Ref Range    Glucose 151 (H) 70 - 99 mg/dL    BUN 17 8 - 23 mg/dL    CREATININE 0.79 0.50 - 0.90 mg/dL    Bun/Cre Ratio NOT REPORTED 9 - 20    Calcium 8.3 (L) 8.6 - 10.4 mg/dL    Sodium 139 135 - 144 mmol/L    Potassium 3.5 (L) 3.7 - 5.3 mmol/L    Chloride 105 98 - 107 mmol/L    CO2 22 20 - 31 mmol/L    Anion Gap 12 9 - 17 mmol/L    Alkaline Phosphatase 185 (H) 35 - 104 U/L     (H) 5 - 33 U/L    AST 1,139 (H) <32 U/L    Total Bilirubin 2.92 (H) 0.3 - 1.2 mg/dL    Total Protein 6.0 (L) 6.4 - 8.3 g/dL    Albumin 3.3 (L) 3.5 - 5.2 g/dL    Albumin/Globulin Ratio 1.2 1.0 - 2.5    GFR Non-African American >60 >60 mL/min    GFR African American >60 >60 mL/min    GFR Comment          GFR Staging NOT REPORTED    CBC    Collection Time: 01/18/22  4:44 AM   Result Value Ref Range    WBC 18.5 (H) 3.5 - 11.3 k/uL    RBC 4.19 3.95 - 5.11 m/uL    Hemoglobin 12.5 11.9 - 15.1 g/dL    Hematocrit 38.0 36.3 - 47.1 %    MCV 90.7  melatonin tablet 3 mg, HS    ondansetron (ZOFRAN) injection 4 mg, Q6H PRN    polyethylene glycol (MIRALAX) packet 17 g, BID    QUEtiapine (SEROquel) tablet 12.5 mg, Q8H PRN    senna (SENOKOT) tablet 17.2 mg, BID    sodium chloride 0.9 % infusion, Continuous, Last Rate: 75 mL/hr (01/23/25 2032)    Administrative Statements   Today, Patient Was Seen By: Mable Yu DO      **Please Note: This note may have been constructed using a voice recognition system.**   82.6 - 102.9 fL    MCH 29.8 25.2 - 33.5 pg    MCHC 32.9 28.4 - 34.8 g/dL    RDW 15.7 (H) 11.8 - 14.4 %    Platelets 411 076 - 196 k/uL    MPV 11.1 8.1 - 13.5 fL    NRBC Automated 0.0 0.0 per 100 WBC   Calcium, Ionized    Collection Time: 01/18/22  4:44 AM   Result Value Ref Range    Calcium, Ion 1.12 (L) 1.13 - 1.33 mmol/L   Culture, Blood 1    Collection Time: 01/18/22  9:30 AM    Specimen: Blood   Result Value Ref Range    Specimen Description . BLOOD     Special Requests  R HAND 1.5ML     Culture NO GROWTH <24 HRS    Culture, Blood 1    Collection Time: 01/18/22  9:37 AM    Specimen: Blood   Result Value Ref Range    Specimen Description . BLOOD     Special Requests  L HAND 3ML     Culture NO GROWTH <24 HRS    Lactate, Sepsis    Collection Time: 01/18/22  9:45 AM   Result Value Ref Range    Lactic Acid, Sepsis NOT REPORTED 0.5 - 1.9 mmol/L    Lactic Acid, Sepsis, Whole Blood 2.3 (H) 0.5 - 1.9 mmol/L   SPECIMEN REJECTION    Collection Time: 01/18/22  9:45 AM   Result Value Ref Range    Specimen Source . BLOOD     Ordered Test BMP     Reason for Rejection Unable to perform testing: Specimen hemolyzed. - NOT REPORTED    Basic Metabolic Panel    Collection Time: 01/18/22 11:29 AM   Result Value Ref Range    Glucose 121 (H) 70 - 99 mg/dL    BUN 20 8 - 23 mg/dL    CREATININE 0.75 0.50 - 0.90 mg/dL    Bun/Cre Ratio NOT REPORTED 9 - 20    Calcium 8.3 (L) 8.6 - 10.4 mg/dL    Sodium 142 135 - 144 mmol/L    Potassium 4.6 3.7 - 5.3 mmol/L    Chloride 109 (H) 98 - 107 mmol/L    CO2 23 20 - 31 mmol/L    Anion Gap 10 9 - 17 mmol/L    GFR Non-African American >60 >60 mL/min    GFR African American >60 >60 mL/min    GFR Comment          GFR Staging NOT REPORTED        Imaging/Diagnostics:  NM HEPATOBILIARY    Result Date: 1/18/2022  No excretion of contrast into the extrahepatic biliary tree or small bowel.  Nonvisualization of the gallbladder, overall appearance favoring acute cholecystitis and suspicious for biliary ductal obstruction. US GALLBLADDER RUQ    Result Date: 1/18/2022  1. Cholelithiases with significant gallbladder wall edema can be seen in the setting of pancreatitis, hepatitis or acute cholecystitis. 2. Trace amount of fluid in Man's pouch. Assessment :      Hospital Problems           Last Modified POA    * (Principal) Ascending cholangitis 1/18/2022 Yes    Cholecystitis 1/18/2022 Yes    Gallstone pancreatitis 1/18/2022 Yes    Essential hypertension 1/18/2022 Yes    Sepsis ([address] 1/18/2022 Yes          Plan:     Patient status inpatient in the Progressive Unit/Step down    1. Sepsis 2/2 Ascending cholangitis from Common bile duct obstruction from gallstones: GI consulted, planning for ERCP. Continue antibiotics. Continue IV fluids. Blood cultures ordered and pending. ID consulted  2. Gallstone pancreatitis: Continue IV fluids, goal urine output 0.5 to 1 cc/h. Monitor for volume overload. N.p.o., advance diet once pain improves. 3. Acute cholecystitis: Confirmed via HIDA scan. General surgery consulted for cholecystectomy  4. Check daily labs  5. DVT ppx  6. PT/OT    Consultations:   IP CONSULT TO GENERAL SURGERY  IP CONSULT TO HOSPITALIST  IP CONSULT TO GI  IP CONSULT TO INFECTIOUS DISEASES     Patient is admitted as inpatient status because of co-morbidities listed above, severity of signs and symptoms as outlined, requirement for current medical therapies and most importantly because of direct risk to patient if care not provided in a hospital setting. Expected length of stay > 48 hours.     Alexis Crowley DO  1/18/2022  6:34 PM    Copy sent to Dr. Deacon Robles DO

## (undated) DEVICE — GLOVE EXAM M L95IN FNGR THK35MIL PALM THK24MIL OFF WHT

## (undated) DEVICE — SYSTEM BX 25GA FN NDL SIX DST CUT EDG SHARKCORE

## (undated) DEVICE — TUBING, SUCTION, 9/32" X 20', STRAIGHT: Brand: MEDLINE INDUSTRIES, INC.

## (undated) DEVICE — CONTAINER,SPECIMEN,4OZ,OR STRL: Brand: MEDLINE

## (undated) DEVICE — ELECTRODE PT RET AD L9FT HI MOIST COND ADH HYDRGEL CORDED